# Patient Record
Sex: MALE | Race: WHITE | Employment: OTHER | URBAN - METROPOLITAN AREA
[De-identification: names, ages, dates, MRNs, and addresses within clinical notes are randomized per-mention and may not be internally consistent; named-entity substitution may affect disease eponyms.]

---

## 2023-05-10 ENCOUNTER — HOSPITAL ENCOUNTER (INPATIENT)
Age: 63
LOS: 2 days | Discharge: HOME | End: 2023-05-12
Attending: EMERGENCY MEDICINE | Admitting: STUDENT IN AN ORGANIZED HEALTH CARE EDUCATION/TRAINING PROGRAM
Payer: COMMERCIAL

## 2023-05-10 ENCOUNTER — APPOINTMENT (OUTPATIENT)
Dept: GENERAL RADIOLOGY | Age: 63
End: 2023-05-10
Payer: COMMERCIAL

## 2023-05-10 DIAGNOSIS — I48.91 NEW ONSET ATRIAL FIBRILLATION (HCC): Primary | ICD-10-CM

## 2023-05-10 DIAGNOSIS — I50.9 NEW ONSET OF CONGESTIVE HEART FAILURE (HCC): ICD-10-CM

## 2023-05-10 DIAGNOSIS — I50.21 ACUTE SYSTOLIC CONGESTIVE HEART FAILURE (HCC): ICD-10-CM

## 2023-05-10 PROBLEM — I10 HYPERTENSION: Status: ACTIVE | Noted: 2023-05-10

## 2023-05-10 LAB
ABSOLUTE EOS #: 0.15 K/UL (ref 0–0.4)
ABSOLUTE IMMATURE GRANULOCYTE: 0 K/UL (ref 0–0.3)
ABSOLUTE LYMPH #: 0.77 K/UL (ref 1–4.8)
ABSOLUTE MONO #: 0.26 K/UL (ref 0.2–0.8)
ALBUMIN SERPL-MCNC: 3.5 G/DL (ref 3.5–5.2)
ALP SERPL-CCNC: 160 U/L (ref 40–129)
ALT SERPL-CCNC: 29 U/L (ref 5–41)
ANION GAP SERPL CALCULATED.3IONS-SCNC: 14 MMOL/L (ref 9–17)
ANTI-XA UNFRAC HEPARIN: 0.1 IU/L (ref 0.3–0.7)
ANTI-XA UNFRAC HEPARIN: 0.15 IU/L (ref 0.3–0.7)
AST SERPL-CCNC: 40 U/L
BASOPHILS # BLD: 0 %
BASOPHILS ABSOLUTE: 0 K/UL (ref 0–0.2)
BILIRUB DIRECT SERPL-MCNC: 1.1 MG/DL
BILIRUB INDIRECT SERPL-MCNC: 0.8 MG/DL (ref 0–1)
BILIRUB SERPL-MCNC: 1.9 MG/DL (ref 0.3–1.2)
BNP SERPL-MCNC: 4016 PG/ML
BUN SERPL-MCNC: 12 MG/DL (ref 8–23)
BUN/CREAT BLD: 15 (ref 9–20)
CALCIUM SERPL-MCNC: 8.9 MG/DL (ref 8.6–10.4)
CHLORIDE SERPL-SCNC: 102 MMOL/L (ref 98–107)
CO2 SERPL-SCNC: 21 MMOL/L (ref 20–31)
CREAT SERPL-MCNC: 0.81 MG/DL (ref 0.7–1.2)
EOSINOPHILS RELATIVE PERCENT: 3 % (ref 1–4)
GFR SERPL CREATININE-BSD FRML MDRD: >60 ML/MIN/1.73M2
GLUCOSE SERPL-MCNC: 86 MG/DL (ref 70–99)
HCT VFR BLD AUTO: 39.6 % (ref 40.7–50.3)
HGB BLD-MCNC: 14 G/DL (ref 13–17)
IMMATURE GRANULOCYTES: 0 %
INR PPP: 1.4
LYMPHOCYTES # BLD: 15 % (ref 24–44)
MAGNESIUM SERPL-MCNC: 1.7 MG/DL (ref 1.6–2.6)
MCH RBC QN AUTO: 32.3 PG (ref 25.2–33.5)
MCHC RBC AUTO-ENTMCNC: 35.4 G/DL (ref 28.4–34.8)
MCV RBC AUTO: 91.2 FL (ref 82.6–102.9)
MONOCYTES # BLD: 5 % (ref 1–7)
NRBC AUTOMATED: 0.6 PER 100 WBC
PARTIAL THROMBOPLASTIN TIME: 28.1 SEC (ref 23.9–33.8)
PDW BLD-RTO: 15.4 % (ref 11.8–14.4)
PLATELET # BLD AUTO: 252 K/UL (ref 138–453)
PMV BLD AUTO: 11.8 FL (ref 8.1–13.5)
POTASSIUM SERPL-SCNC: 4.1 MMOL/L (ref 3.7–5.3)
PROT SERPL-MCNC: 6.6 G/DL (ref 6.4–8.3)
PROTHROMBIN TIME: 16.9 SEC (ref 11.5–14.2)
RBC # BLD: 4.34 M/UL (ref 4.21–5.77)
SEG NEUTROPHILS: 77 % (ref 36–66)
SEGMENTED NEUTROPHILS ABSOLUTE COUNT: 3.92 K/UL (ref 1.8–7.7)
SODIUM SERPL-SCNC: 137 MMOL/L (ref 135–144)
TROPONIN I SERPL DL<=0.01 NG/ML-MCNC: 25 NG/L (ref 0–22)
TROPONIN I SERPL DL<=0.01 NG/ML-MCNC: 26 NG/L (ref 0–22)
WBC # BLD AUTO: 5.1 K/UL (ref 3.5–11.3)

## 2023-05-10 PROCEDURE — 6360000002 HC RX W HCPCS: Performed by: EMERGENCY MEDICINE

## 2023-05-10 PROCEDURE — 36415 COLL VENOUS BLD VENIPUNCTURE: CPT

## 2023-05-10 PROCEDURE — 1200000000 HC SEMI PRIVATE

## 2023-05-10 PROCEDURE — 99223 1ST HOSP IP/OBS HIGH 75: CPT | Performed by: NURSE PRACTITIONER

## 2023-05-10 PROCEDURE — 83735 ASSAY OF MAGNESIUM: CPT

## 2023-05-10 PROCEDURE — 6370000000 HC RX 637 (ALT 250 FOR IP): Performed by: NURSE PRACTITIONER

## 2023-05-10 PROCEDURE — 2580000003 HC RX 258: Performed by: NURSE PRACTITIONER

## 2023-05-10 PROCEDURE — 80048 BASIC METABOLIC PNL TOTAL CA: CPT

## 2023-05-10 PROCEDURE — 99285 EMERGENCY DEPT VISIT HI MDM: CPT

## 2023-05-10 PROCEDURE — 80076 HEPATIC FUNCTION PANEL: CPT

## 2023-05-10 PROCEDURE — 93005 ELECTROCARDIOGRAM TRACING: CPT | Performed by: EMERGENCY MEDICINE

## 2023-05-10 PROCEDURE — 85730 THROMBOPLASTIN TIME PARTIAL: CPT

## 2023-05-10 PROCEDURE — 84484 ASSAY OF TROPONIN QUANT: CPT

## 2023-05-10 PROCEDURE — 85025 COMPLETE CBC W/AUTO DIFF WBC: CPT

## 2023-05-10 PROCEDURE — 85610 PROTHROMBIN TIME: CPT

## 2023-05-10 PROCEDURE — 96374 THER/PROPH/DIAG INJ IV PUSH: CPT

## 2023-05-10 PROCEDURE — 71045 X-RAY EXAM CHEST 1 VIEW: CPT

## 2023-05-10 PROCEDURE — 83880 ASSAY OF NATRIURETIC PEPTIDE: CPT

## 2023-05-10 PROCEDURE — 2500000003 HC RX 250 WO HCPCS: Performed by: EMERGENCY MEDICINE

## 2023-05-10 PROCEDURE — 6360000002 HC RX W HCPCS: Performed by: NURSE PRACTITIONER

## 2023-05-10 PROCEDURE — 85520 HEPARIN ASSAY: CPT

## 2023-05-10 RX ORDER — M-VIT,TX,IRON,MINS/CALC/FOLIC 27MG-0.4MG
1 TABLET ORAL DAILY
COMMUNITY

## 2023-05-10 RX ORDER — MAGNESIUM SULFATE IN WATER 40 MG/ML
2000 INJECTION, SOLUTION INTRAVENOUS ONCE
Status: COMPLETED | OUTPATIENT
Start: 2023-05-10 | End: 2023-05-10

## 2023-05-10 RX ORDER — HEPARIN SODIUM 10000 [USP'U]/100ML
5-30 INJECTION, SOLUTION INTRAVENOUS CONTINUOUS
Status: DISCONTINUED | OUTPATIENT
Start: 2023-05-10 | End: 2023-05-12

## 2023-05-10 RX ORDER — HEPARIN SODIUM 1000 [USP'U]/ML
4000 INJECTION, SOLUTION INTRAVENOUS; SUBCUTANEOUS ONCE
Status: COMPLETED | OUTPATIENT
Start: 2023-05-10 | End: 2023-05-10

## 2023-05-10 RX ORDER — ONDANSETRON 2 MG/ML
4 INJECTION INTRAMUSCULAR; INTRAVENOUS EVERY 6 HOURS PRN
Status: DISCONTINUED | OUTPATIENT
Start: 2023-05-10 | End: 2023-05-12 | Stop reason: HOSPADM

## 2023-05-10 RX ORDER — FUROSEMIDE 10 MG/ML
40 INJECTION INTRAMUSCULAR; INTRAVENOUS ONCE
Status: COMPLETED | OUTPATIENT
Start: 2023-05-10 | End: 2023-05-10

## 2023-05-10 RX ORDER — FOLIC ACID 1 MG/1
1 TABLET ORAL DAILY
COMMUNITY

## 2023-05-10 RX ORDER — HEPARIN SODIUM 1000 [USP'U]/ML
2000 INJECTION, SOLUTION INTRAVENOUS; SUBCUTANEOUS PRN
Status: DISCONTINUED | OUTPATIENT
Start: 2023-05-10 | End: 2023-05-12

## 2023-05-10 RX ORDER — POLYETHYLENE GLYCOL 3350 17 G/17G
17 POWDER, FOR SOLUTION ORAL DAILY PRN
Status: DISCONTINUED | OUTPATIENT
Start: 2023-05-10 | End: 2023-05-12 | Stop reason: HOSPADM

## 2023-05-10 RX ORDER — FUROSEMIDE 10 MG/ML
40 INJECTION INTRAMUSCULAR; INTRAVENOUS 2 TIMES DAILY
Status: DISCONTINUED | OUTPATIENT
Start: 2023-05-11 | End: 2023-05-12

## 2023-05-10 RX ORDER — ONDANSETRON 4 MG/1
4 TABLET, ORALLY DISINTEGRATING ORAL EVERY 8 HOURS PRN
Status: DISCONTINUED | OUTPATIENT
Start: 2023-05-10 | End: 2023-05-12 | Stop reason: HOSPADM

## 2023-05-10 RX ORDER — ACETAMINOPHEN 325 MG/1
650 TABLET ORAL EVERY 6 HOURS PRN
Status: DISCONTINUED | OUTPATIENT
Start: 2023-05-10 | End: 2023-05-12 | Stop reason: HOSPADM

## 2023-05-10 RX ORDER — METOPROLOL TARTRATE 5 MG/5ML
5 INJECTION INTRAVENOUS ONCE
Status: COMPLETED | OUTPATIENT
Start: 2023-05-10 | End: 2023-05-10

## 2023-05-10 RX ORDER — ENOXAPARIN SODIUM 150 MG/ML
1 INJECTION SUBCUTANEOUS 2 TIMES DAILY
Status: CANCELLED | OUTPATIENT
Start: 2023-05-10

## 2023-05-10 RX ORDER — SODIUM CHLORIDE 0.9 % (FLUSH) 0.9 %
5-40 SYRINGE (ML) INJECTION EVERY 12 HOURS SCHEDULED
Status: DISCONTINUED | OUTPATIENT
Start: 2023-05-10 | End: 2023-05-12 | Stop reason: HOSPADM

## 2023-05-10 RX ORDER — LEFLUNOMIDE 20 MG/1
20 TABLET ORAL DAILY
COMMUNITY

## 2023-05-10 RX ORDER — HEPARIN SODIUM 1000 [USP'U]/ML
4000 INJECTION, SOLUTION INTRAVENOUS; SUBCUTANEOUS PRN
Status: DISCONTINUED | OUTPATIENT
Start: 2023-05-10 | End: 2023-05-12

## 2023-05-10 RX ORDER — SODIUM CHLORIDE 0.9 % (FLUSH) 0.9 %
10 SYRINGE (ML) INJECTION PRN
Status: DISCONTINUED | OUTPATIENT
Start: 2023-05-10 | End: 2023-05-12 | Stop reason: HOSPADM

## 2023-05-10 RX ORDER — ACETAMINOPHEN 650 MG/1
650 SUPPOSITORY RECTAL EVERY 6 HOURS PRN
Status: DISCONTINUED | OUTPATIENT
Start: 2023-05-10 | End: 2023-05-12 | Stop reason: HOSPADM

## 2023-05-10 RX ORDER — SODIUM CHLORIDE 9 MG/ML
INJECTION, SOLUTION INTRAVENOUS PRN
Status: DISCONTINUED | OUTPATIENT
Start: 2023-05-10 | End: 2023-05-12 | Stop reason: HOSPADM

## 2023-05-10 RX ADMIN — HEPARIN SODIUM 2000 UNITS: 1000 INJECTION INTRAVENOUS; SUBCUTANEOUS at 23:41

## 2023-05-10 RX ADMIN — METOPROLOL TARTRATE 5 MG: 5 INJECTION INTRAVENOUS at 16:08

## 2023-05-10 RX ADMIN — HEPARIN SODIUM 4000 UNITS: 1000 INJECTION INTRAVENOUS; SUBCUTANEOUS at 17:24

## 2023-05-10 RX ADMIN — HEPARIN SODIUM 8 UNITS/KG/HR: 10000 INJECTION, SOLUTION INTRAVENOUS at 17:27

## 2023-05-10 RX ADMIN — METOPROLOL TARTRATE 25 MG: 25 TABLET, FILM COATED ORAL at 20:45

## 2023-05-10 RX ADMIN — FUROSEMIDE 40 MG: 10 INJECTION, SOLUTION INTRAMUSCULAR; INTRAVENOUS at 17:27

## 2023-05-10 RX ADMIN — SODIUM CHLORIDE, PRESERVATIVE FREE 10 ML: 5 INJECTION INTRAVENOUS at 20:45

## 2023-05-10 RX ADMIN — SODIUM CHLORIDE: 9 INJECTION, SOLUTION INTRAVENOUS at 20:42

## 2023-05-10 RX ADMIN — MAGNESIUM SULFATE HEPTAHYDRATE 2000 MG: 40 INJECTION, SOLUTION INTRAVENOUS at 20:45

## 2023-05-10 ASSESSMENT — ENCOUNTER SYMPTOMS
NAUSEA: 1
SHORTNESS OF BREATH: 1

## 2023-05-10 ASSESSMENT — PAIN - FUNCTIONAL ASSESSMENT: PAIN_FUNCTIONAL_ASSESSMENT: NONE - DENIES PAIN

## 2023-05-10 NOTE — PROGRESS NOTES
ADMISSION NOTE         Patient admitted to room: 2042     Time of admit: 1819    Admit from: ED    Reason for admission: New onset AFib    Where patient has been residing for the last 24 hrs: personal residence      Family at bedside: No     Patient is currently: Patient ambulated to bed with steady gait, vitals obtained, telemetry applied. Patient denies any pain, including specifically any chest pain. No distress apparent. Patient oriented to room, educated on how to use call light, bed controls, and to call for assistance. Bed in lowest and locked position. Two siderails up for safety. Call light within reach. Verbal safety instructions provided and patient verbalizd understanding.

## 2023-05-10 NOTE — PROGRESS NOTES
Transitions of Care Pharmacy Service   Medication Review    The patient's list of current home medications was reviewed and updated earlier today while he was in the ED. Source(s) of information: patient, Surescripts refill report      PROVIDER ACTION REQUESTED  Medications that need to be addressed by a physician/nurse practitioner:    Medication Action Requested        Home med list is ready for physician review         Please feel free to call me with any questions about this encounter. Thank you.     Bruce Mayo, [facility]   Transitions of Care Pharmacy Service  Phone:  [phone]  Fax: [phone]      Electronically signed by Bruce Mayo [facility] on 5/10/2023 at 7:11 PM           Medications Prior to Admission:   folic acid (FOLVITE) 1 MG tablet, Take 1 tablet by mouth daily  leflunomide (ARAVA) 20 MG tablet, Take 1 tablet by mouth daily  methotrexate (RHEUMATREX) 2.5 MG chemo tablet, Take 6 tablets by mouth once a week Indications: Mondays Takes 6 tabs (15mg) weekly  Multiple Vitamins-Minerals (THERAPEUTIC MULTIVITAMIN-MINERALS) tablet, Take 1 tablet by mouth daily  sulfaSALAzine (AZULFIDINE) 500 MG tablet, Take 2 tablets by mouth 2 times daily  naproxen (NAPROSYN) 500 MG tablet, Take 1 tablet by mouth 2 times daily as needed

## 2023-05-10 NOTE — ED PROVIDER NOTES
Notable for the following components:    Pro-BNP 4,016 (*)     All other components within normal limits   CBC WITH AUTO DIFFERENTIAL - Abnormal; Notable for the following components:    Hematocrit 39.6 (*)     MCHC 35.4 (*)     RDW 15.4 (*)     NRBC Automated 0.6 (*)     Seg Neutrophils 77 (*)     Lymphocytes 15 (*)     Absolute Lymph # 0.77 (*)     All other components within normal limits   BASIC METABOLIC PANEL   TROPONIN       Vitals Reviewed:    Vitals:    05/10/23 1337 05/10/23 1432 05/10/23 1439 05/10/23 1615   BP: (!) 134/103   (!) 132/110   Pulse: 77 (!) 125 (!) 113 (!) 110   Resp: 16 24 23 17   Temp: 98.2 °F (36.8 °C)      TempSrc: Oral      SpO2: 97% 96% 96% 95%   Weight: 261 lb (118.4 kg)      Height: 6' 4\" (1.93 m)        MEDICATIONS GIVEN TO PATIENT THIS ENCOUNTER:  Orders Placed This Encounter   Medications    metoprolol (LOPRESSOR) injection 5 mg    furosemide (LASIX) injection 40 mg     DISCHARGE PRESCRIPTIONS:  New Prescriptions    No medications on file     PHYSICIAN CONSULTS ORDERED THIS ENCOUNTER:  IP CONSULT TO INTERNAL MEDICINE  IP CONSULT TO CARDIOLOGY  FINAL IMPRESSION      1. New onset atrial fibrillation ([address]    2. New onset of congestive heart failure ([facility] [address]          DISPOSITION/PLAN   DISPOSITION Decision To Admit 05/10/2023 04:17:57 PM      OUTPATIENT FOLLOW UP THE PATIENT:  No follow-up provider specified.     MD Glen León MD  05/10/23 8457

## 2023-05-10 NOTE — H&P
Family History   Problem Relation Age of Onset    No Known Problems Mother     No Known Problems Father        Review of Systems:     Positive and Negative as described in HPI. Review of Systems   Respiratory:  Positive for shortness of breath. Cardiovascular:  Positive for leg swelling. Gastrointestinal:  Positive for nausea. Neurological:  Positive for dizziness (with exertion). All other systems reviewed and are negative. Physical Exam:   BP (!) 132/110   Pulse 99   Temp 98.2 °F (36.8 °C) (Oral)   Resp 17   Ht 6' 4\" (1.93 m)   Wt 261 lb (118.4 kg)   SpO2 96%   BMI 31.77 kg/m²   Temp (24hrs), Av.2 °F (36.8 °C), Min:98.2 °F (36.8 °C), Max:98.2 °F (36.8 °C)    No results for input(s): POCGLU in the last 72 hours. No intake or output data in the 24 hours ending 05/10/23 9057    Physical Exam  Vitals and nursing note reviewed. HENT:      Mouth/Throat:      Mouth: Mucous membranes are moist.   Eyes:      Conjunctiva/sclera: Conjunctivae normal.   Cardiovascular:      Rate and Rhythm: Normal rate. Rhythm irregular. Pulses: Normal pulses. Heart sounds: Normal heart sounds. Pulmonary:      Effort: Pulmonary effort is normal.      Breath sounds: Normal breath sounds. Abdominal:      General: Bowel sounds are normal.      Palpations: Abdomen is soft. Musculoskeletal:      Cervical back: Neck supple. Right lower le+ Edema present. Left lower le+ Edema present. Skin:     General: Skin is warm and dry. Capillary Refill: Capillary refill takes less than 2 seconds. Neurological:      Mental Status: He is alert and oriented to person, place, and time.    Psychiatric:         Mood and Affect: Mood normal.       Investigations:      Laboratory Testing:  Recent Results (from the past 24 hour(s))   EKG 12 Lead    Collection Time: 05/10/23  1:55 PM   Result Value Ref Range    Ventricular Rate 110 BPM    Atrial Rate 166 BPM    QRS Duration 96 ms    Q-T Interval

## 2023-05-10 NOTE — ED NOTES
ED to inpatient nurses report     Chief Complaint   Patient presents with    Irregular Heart Beat     Sent by PCP    Leg Swelling     bilat      Present to ED from home with Irregular Heart Beat (Sent by PCP) and Leg Swelling (bilat)   and is admitted to the hospital for the management of Atrial fibrillation with RVR ([address] The patent was  sent from his doctor's office for new onset atrial fibrillation and lower extremity edema. Patient reports exertional dyspnea, occasional dizziness and lower extremity edema over the past few months. This morning, he had his first appointment with Dr. Pradeep Oliveira, who noted atrial fibrillation with RVR on his EKG and sent him here. Patient's medical history includes lymphedema and arthritis. Chest x-ray showed no acute process  Potassium 4.1  BNP is 4016 troponin 26  EKG here confirmed A-fib with RVR     The patient was given 5 of IV Lopressor 4 mg IV Lasix  LOC: alert and orientated to name, place, date  Vital signs   Vitals:    05/10/23 1605 05/10/23 1610 05/10/23 1615 05/10/23 1640   BP: (!) 153/103 (!) 129/109 (!) 132/110    Pulse: (!) 108 (!) 112 (!) 110 99   Resp: 18 22 17 17   Temp:       TempSrc:       SpO2: 96% 96% 95% 96%   Weight:       Height:          Oxygen Baseline RA    Current needs required RA     LDAs:   Peripheral IV 05/10/23 Right Hand (Active)   Site Assessment Clean, dry & intact 05/10/23 1418   Line Status Blood return noted; Flushed 05/10/23 1418   Dressing Status New dressing applied;Clean, dry & intact 05/10/23 1418     Mobility: Fully dependent  Fall Risk:    Pending ED orders: None  Present condition: Stable  Code Status: Full  Consults: IP CONSULT TO INTERNAL MEDICINE  IP CONSULT TO CARDIOLOGY  [x]  Hospitalist  Completed  [x] yes [] no Who:   []  Medicine  Completed  [] yes [] No Who:   []  Cardiology  Completed  [] yes [] No Who:   []  GI   Completed  [] yes [] No Who:   []  Neurology  Completed  [] yes [] No Who:   []  Nephrology Completed  []

## 2023-05-11 ENCOUNTER — APPOINTMENT (OUTPATIENT)
Dept: ULTRASOUND IMAGING | Age: 63
End: 2023-05-11
Payer: COMMERCIAL

## 2023-05-11 LAB
ANION GAP SERPL CALCULATED.3IONS-SCNC: 12 MMOL/L (ref 9–17)
ANTI-XA UNFRAC HEPARIN: 0.22 IU/L (ref 0.3–0.7)
ANTI-XA UNFRAC HEPARIN: 0.27 IU/L (ref 0.3–0.7)
ANTI-XA UNFRAC HEPARIN: 0.31 IU/L (ref 0.3–0.7)
BNP SERPL-MCNC: 3638 PG/ML
BUN SERPL-MCNC: 14 MG/DL (ref 8–23)
BUN/CREAT BLD: 15 (ref 9–20)
CALCIUM SERPL-MCNC: 8.8 MG/DL (ref 8.6–10.4)
CHLORIDE SERPL-SCNC: 101 MMOL/L (ref 98–107)
CO2 SERPL-SCNC: 25 MMOL/L (ref 20–31)
CREAT SERPL-MCNC: 0.95 MG/DL (ref 0.7–1.2)
EKG ATRIAL RATE: 166 BPM
EKG Q-T INTERVAL: 348 MS
EKG QRS DURATION: 96 MS
EKG QTC CALCULATION (BAZETT): 470 MS
EKG R AXIS: 1 DEGREES
EKG T AXIS: 62 DEGREES
EKG VENTRICULAR RATE: 110 BPM
GFR SERPL CREATININE-BSD FRML MDRD: >60 ML/MIN/1.73M2
GLUCOSE SERPL-MCNC: 91 MG/DL (ref 70–99)
HCT VFR BLD AUTO: 40 % (ref 40.7–50.3)
HGB BLD-MCNC: 13.5 G/DL (ref 13–17)
LV EF: 30 %
LV EF: 40 %
LVEF MODALITY: NORMAL
LVEF MODALITY: NORMAL
MAGNESIUM SERPL-MCNC: 1.9 MG/DL (ref 1.6–2.6)
MCH RBC QN AUTO: 31.3 PG (ref 25.2–33.5)
MCHC RBC AUTO-ENTMCNC: 33.8 G/DL (ref 28.4–34.8)
MCV RBC AUTO: 92.8 FL (ref 82.6–102.9)
NRBC AUTOMATED: 1.3 PER 100 WBC
PDW BLD-RTO: 15.7 % (ref 11.8–14.4)
PLATELET # BLD AUTO: 241 K/UL (ref 138–453)
PMV BLD AUTO: 11.6 FL (ref 8.1–13.5)
POTASSIUM SERPL-SCNC: 4 MMOL/L (ref 3.7–5.3)
RBC # BLD: 4.31 M/UL (ref 4.21–5.77)
SODIUM SERPL-SCNC: 138 MMOL/L (ref 135–144)
TSH SERPL-ACNC: 2.92 UIU/ML (ref 0.3–5)
WBC # BLD AUTO: 3.9 K/UL (ref 3.5–11.3)

## 2023-05-11 PROCEDURE — 93306 TTE W/DOPPLER COMPLETE: CPT

## 2023-05-11 PROCEDURE — 99232 SBSQ HOSP IP/OBS MODERATE 35: CPT | Performed by: STUDENT IN AN ORGANIZED HEALTH CARE EDUCATION/TRAINING PROGRAM

## 2023-05-11 PROCEDURE — 6370000000 HC RX 637 (ALT 250 FOR IP): Performed by: NURSE PRACTITIONER

## 2023-05-11 PROCEDURE — 93312 ECHO TRANSESOPHAGEAL: CPT

## 2023-05-11 PROCEDURE — 6360000002 HC RX W HCPCS: Performed by: NURSE PRACTITIONER

## 2023-05-11 PROCEDURE — 92960 CARDIOVERSION ELECTRIC EXT: CPT | Performed by: INTERNAL MEDICINE

## 2023-05-11 PROCEDURE — 6370000000 HC RX 637 (ALT 250 FOR IP)

## 2023-05-11 PROCEDURE — 93010 ELECTROCARDIOGRAM REPORT: CPT | Performed by: INTERNAL MEDICINE

## 2023-05-11 PROCEDURE — 85520 HEPARIN ASSAY: CPT

## 2023-05-11 PROCEDURE — 84443 ASSAY THYROID STIM HORMONE: CPT

## 2023-05-11 PROCEDURE — 1200000000 HC SEMI PRIVATE

## 2023-05-11 PROCEDURE — B24BZZ4 ULTRASONOGRAPHY OF HEART WITH AORTA, TRANSESOPHAGEAL: ICD-10-PCS | Performed by: INTERNAL MEDICINE

## 2023-05-11 PROCEDURE — 36415 COLL VENOUS BLD VENIPUNCTURE: CPT

## 2023-05-11 PROCEDURE — 6360000002 HC RX W HCPCS

## 2023-05-11 PROCEDURE — 6360000002 HC RX W HCPCS: Performed by: EMERGENCY MEDICINE

## 2023-05-11 PROCEDURE — 80048 BASIC METABOLIC PNL TOTAL CA: CPT

## 2023-05-11 PROCEDURE — 83735 ASSAY OF MAGNESIUM: CPT

## 2023-05-11 PROCEDURE — 83880 ASSAY OF NATRIURETIC PEPTIDE: CPT

## 2023-05-11 PROCEDURE — 76705 ECHO EXAM OF ABDOMEN: CPT

## 2023-05-11 PROCEDURE — 92960 CARDIOVERSION ELECTRIC EXT: CPT

## 2023-05-11 PROCEDURE — 93312 ECHO TRANSESOPHAGEAL: CPT | Performed by: INTERNAL MEDICINE

## 2023-05-11 PROCEDURE — 85027 COMPLETE CBC AUTOMATED: CPT

## 2023-05-11 RX ADMIN — FUROSEMIDE 40 MG: 10 INJECTION, SOLUTION INTRAMUSCULAR; INTRAVENOUS at 10:00

## 2023-05-11 RX ADMIN — HEPARIN SODIUM 2000 UNITS: 1000 INJECTION INTRAVENOUS; SUBCUTANEOUS at 21:06

## 2023-05-11 RX ADMIN — HEPARIN SODIUM 12 UNITS/KG/HR: 10000 INJECTION, SOLUTION INTRAVENOUS at 15:39

## 2023-05-11 RX ADMIN — METOPROLOL TARTRATE 25 MG: 25 TABLET, FILM COATED ORAL at 20:38

## 2023-05-11 RX ADMIN — METOPROLOL TARTRATE 25 MG: 25 TABLET, FILM COATED ORAL at 11:25

## 2023-05-11 RX ADMIN — HEPARIN SODIUM 2000 UNITS: 1000 INJECTION INTRAVENOUS; SUBCUTANEOUS at 07:18

## 2023-05-11 RX ADMIN — FUROSEMIDE 40 MG: 10 INJECTION, SOLUTION INTRAMUSCULAR; INTRAVENOUS at 18:34

## 2023-05-11 ASSESSMENT — ENCOUNTER SYMPTOMS
ABDOMINAL PAIN: 0
COUGH: 0
SHORTNESS OF BREATH: 0
EYE REDNESS: 0
EYE PAIN: 0
ABDOMINAL DISTENTION: 0
COLOR CHANGE: 0

## 2023-05-11 NOTE — PROGRESS NOTES
Patient refused labs to be drawn by  as well as a ER . RN educated patient on the importance of being on a heparin gtt and it being therapeutic.  Patient agreeable for lab to be drawn and understands

## 2023-05-11 NOTE — PLAN OF CARE
Problem: Chronic Conditions and Co-morbidities  Goal: Patient's chronic conditions and co-morbidity symptoms are monitored and maintained or improved  5/11/2023 0642 by Belinda Severe, RN  Outcome: Progressing  5/11/2023 0641 by Belinda Severe, RN  Outcome: Progressing  Flowsheets (Taken 5/10/2023 2000)  Care Plan - Patient's Chronic Conditions and Co-Morbidity Symptoms are Monitored and Maintained or Improved:   Monitor and assess patient's chronic conditions and comorbid symptoms for stability, deterioration, or improvement   Collaborate with multidisciplinary team to address chronic and comorbid conditions and prevent exacerbation or deterioration   Update acute care plan with appropriate goals if chronic or comorbid symptoms are exacerbated and prevent overall improvement and discharge     Problem: Discharge Planning  Goal: Discharge to home or other facility with appropriate resources  5/11/2023 0642 by Belinda Severe, RN  Outcome: Progressing  5/11/2023 0641 by Belinda Severe, RN  Outcome: Progressing  Flowsheets  Taken 5/10/2023 2000 by Belinda Severe, RN  Discharge to home or other facility with appropriate resources:   Identify barriers to discharge with patient and caregiver   Arrange for needed discharge resources and transportation as appropriate   Identify discharge learning needs (meds, wound care, etc)   Refer to discharge planning if patient needs post-hospital services based on physician order or complex needs related to functional status, cognitive ability or social support system  Taken 5/10/2023 1853 by Olamide Powell RN  Discharge to home or other facility with appropriate resources:   Identify barriers to discharge with patient and caregiver   Arrange for needed discharge resources and transportation as appropriate   Identify discharge learning needs (meds, wound care, etc)   Refer to discharge planning if patient needs post-hospital services based on physician order or complex

## 2023-05-11 NOTE — OP NOTE
[facility]  RAYSA / Cardioversion procedure Note         Today's Date: 5/11/2023  Primary/Ordering Cardiologist:   Indication:     Operators:  Primary:  Assistant/CV Fellow:     Pre Procedure Conscious Sedation Data:    ASA Class:    [] I [] II [x] III [] IV    Mallampati Class:  [] I [] II [x] III [] IV    Procedure:    Patient seen and examined. History and Physical reviewed. Labs reviewed. After informed consent was obtained with explanation of the risks and benefits, the patient was brought to Cath lab. All sedation was administered by the cardiologist. The oropharynx was pre anesthetisized with cetacaine spray. RAYSA:    Structures:    LA: Normal  GORDO: No thrombus  RA: Normal  RV:  Normal  LV: Severe global hypokinesia  Estimated LVEF: 30%  Aorta: Mild atheromatous disease arch  Percardium: No pericardial effusion  Septum: No intracardiac shunt via color Doppler. No intracardiac shunt via injection of agitated saline. Valves:    Mitral Valve: Structurally normal. Moderate regurgitation is identified. Aortic Valve: The aortic valve is trileaflet and opens adequately. no regurgitiation is identified. Tricuspid valve: Structurally normal. moderate regurgitation is identified. Pulmonary valve: Normal. No significant regurgitation    No valvular vegetations or thrombus identified. Summary:     1. A RAYSA was performed without complications. 2. LVEF 25-30%  3. No thrombus or valvular vegetation identified      CARDIOVERSION:    After an adequate level of sedation was achieved, 200J in biphasic synchronized delivery was administered. conversion to normal sinus rhythm. The patient awoke without complications. A post procedure 12 L ECG was ordered and reviewed. The patient will continue with the discharge meds and has been instructed to follow-up with Dr. Nathalie Mcarthur for continued long term care and cardiovascular management. There were no complications encountered.       Electronically signed by

## 2023-05-11 NOTE — PLAN OF CARE
Problem: Chronic Conditions and Co-morbidities  Goal: Patient's chronic conditions and co-morbidity symptoms are monitored and maintained or improved  5/11/2023 1443 by Vu Kapoor RN  Outcome: Progressing  Flowsheets (Taken 5/11/2023 0800)  Care Plan - Patient's Chronic Conditions and Co-Morbidity Symptoms are Monitored and Maintained or Improved:   Update acute care plan with appropriate goals if chronic or comorbid symptoms are exacerbated and prevent overall improvement and discharge   Collaborate with multidisciplinary team to address chronic and comorbid conditions and prevent exacerbation or deterioration   Monitor and assess patient's chronic conditions and comorbid symptoms for stability, deterioration, or improvement     Problem: Discharge Planning  Goal: Discharge to home or other facility with appropriate resources  5/11/2023 1443 by Vu Kapoor RN  Outcome: Progressing  Flowsheets (Taken 5/11/2023 0800)  Discharge to home or other facility with appropriate resources:   Identify barriers to discharge with patient and caregiver   Arrange for needed discharge resources and transportation as appropriate   Identify discharge learning needs (meds, wound care, etc)   Arrange for interpreters to assist at discharge as needed   Refer to discharge planning if patient needs post-hospital services based on physician order or complex needs related to functional status, cognitive ability or social support system     Problem: Safety - Adult  Goal: Free from fall injury  5/11/2023 1443 by Vu Kapoor RN  Outcome: Progressing  Flowsheets (Taken 5/11/2023 0800)  Free From Fall Injury: Instruct family/caregiver on patient safety     Problem: ABCDS Injury Assessment  Goal: Absence of physical injury  5/11/2023 1443 by Vu Kapoor RN  Outcome: Progressing  Flowsheets (Taken 5/11/2023 0800)  Absence of Physical Injury: Implement safety measures based on patient assessment     Problem: Cardiovascular - Adult  Goal:

## 2023-05-11 NOTE — FLOWSHEET NOTE
05/10/23 2330   Skin Integumentary    Skin Integumentary (WDL) X   Skin Color Other (comment)  (Head purple, body pale when laying down)       When adjusting heparin gtt dose, Patient was observed to be laying flat trying to sleep. His face and neck were discolored and appeared purple. Second RN confirmed his face does look purple when dual signing off on Heparin. Patient asked to sit up and HOB adjusted to semi fowlers. Patient stated that he may as well sit up since he won't be getting sleep tonight. He then stated he would try to sit in the recliner. 20 minutes later when applying his BP cuff for midnight vitals, his color improved to baseline.  But he then went back to lying flat in bed.     0400  No other color changes noted during hourly rounding

## 2023-05-11 NOTE — PROGRESS NOTES
Physician Progress Note      PATIENT:               Sarahi Partida  [facility] #:                  [id number]  :                       1960  ADMIT DATE:       5/10/2023 1:43 PM  [address] [place] DATE:  RESPONDING  PROVIDER #:        Pedro Pablo Arredondo MD          QUERY TEXT:    Patient admitted with irregular heartbeat, noted to have atrial fibrillation. If possible, please document in progress notes and discharge summary if you   are evaluating and/or treating any of the following: The medical record reflects the following:  Risk Factors: HTN, CHF  Clinical Indicators: [facility] of , patient with new onset afib  Treatment: Heparin Drip    Thank you,  Bruce Lyn, RN  Options provided:  -- Secondary hypercoagulable state in a patient with atrial fibrillation  -- Other - I will add my own diagnosis  -- Disagree - Not applicable / Not valid  -- Disagree - Clinically unable to determine / Unknown  -- Refer to Clinical Documentation Reviewer    PROVIDER RESPONSE TEXT:    This patient has secondary hypercoagulable state in a patient with atrial   fibrillation. Query created by:  Chantel Rogers on 2023 5:41 AM      Electronically signed by:  Pedro Pablo Arredondo MD 2023 1:49 PM

## 2023-05-11 NOTE — PROGRESS NOTES
[facility]  Office: [address], DO, Tejas Less, DO, Amina Covelo, DO, Julius Phills Blood, DO, Israel Villafuerte MD, Colton Beckett MD, Silvia Guevara MD, Myesha Goins MD,  Candace Mccormack MD, Deisy Lindquist MD, Migue Graf, DO, Evan Jean MD,  Ace Méndez MD, Di Sanchez MD, Ratna Mccray, DO, Jacobo Tavarez MD, Sandy Young MD, Matheus Thurman, DO, Angelina Dahl MD, Johnetta Aschoff, MD, Mira Messina MD, Ruy Kyle MD,  Angeline Persaud DO, Des Karimi MD,  Gely Briscoe CNP,  Stephanie Fay CNP, Israel James CNP, Jaleesa Baron, CNP,  Christina Eisenmenger, [facility], Irene Mohr, CNP, Mariano Chamorro, CNP, Carolee Tuttle, CNP, Gus Diez, CNP, Ervin Brown, CNP, [place] PA-C, Cande Swartz, CNS, Ramon Sims, CNP, Spencer Nicole, [facility]    Progress Note    5/11/2023    8:20 AM    Name:   Karina Dudley  MRN:     [id number]     Acct:      [de-identified]   Room:   2042/2042-01  IP Day:  1  Admit Date:  5/10/2023  1:43 PM    PCP:   No primary care provider on file. Code Status:  Full Code    Subjective:     C/C:   Chief Complaint   Patient presents with    Irregular Heart Beat     Sent by PCP    Leg Swelling     bilat     Interval History Status: not changed. Patietn seen and examined. Feels ok, heart rate improved. Leg swelling per patient has improved somewhat but  is still greater than his prior baseline. Brief History:     58year old male with past medical history of daily alcohol use presents to PCP office for fatigue and leg swellign noted to have Afib with RVR. Patient admitted for afib with rvr and new onset CHF exacerbation. Patient being diuresed with IV lasix and on heparin drip for anticoagulation    Review of Systems:     Review of Systems   Constitutional:  Negative for activity change, fatigue and fever.    HENT:  Negative for congestion and

## 2023-05-11 NOTE — CONSULTS
05/10/23  1410 05/11/23  0450   WBC 5.1 3.9   HGB 14.0 13.5   HCT 39.6* 40.0*    241     BMP:   Recent Labs     05/10/23  1410 05/11/23  0450    138   K 4.1 4.0   CO2 21 25   BUN 12 14   CREATININE 0.81 0.95   LABGLOM >60 >60   GLUCOSE 86 91     BNP: No results for input(s): BNP in the last 72 hours. PT/INR:   Recent Labs     05/10/23  1705   PROTIME 16.9*   INR 1.4     APTT:  Recent Labs     05/10/23  1705   APTT 28.1     CARDIAC ENZYMES:No results for input(s): CKTOTAL, CKMB, CKMBINDEX, TROPONINI in the last 72 hours. FASTING LIPID PANEL:No results found for: HDL, LDLDIRECT, LDLCALC, TRIG  LIVER PROFILE:  Recent Labs     05/10/23  1610   AST 40*   ALT 29   LABALBU 3.5       IMPRESSION:    Patient Active Problem List   Diagnosis    Atrial fibrillation with RVR (HCC)    Hypertension    Acute CHF (congestive heart failure) (HCC)     Atrial fibrillation, new onset  Acute CHF, systolic vs diastolic  Minimal HS trop elevation, flat trend likely demand ischemia  HTN    RECOMMENDATIONS:  Continue IV heparin  Continue metoprolol  Continue IV lasix. RAYSA/CV recommended. Risk and benefits discussed. OP stress test.      Discussed with patient and Nurse.     Sancho Campbell MD, MD  [facility]           [phone]

## 2023-05-11 NOTE — CARE COORDINATION
Case Management Assessment  Initial Evaluation    Date/Time of Evaluation: 5/11/2023 1:25 PM  Assessment Completed by: Daryl Jennings RN    If patient is discharged prior to next notation, then this note serves as note for discharge by case management. Patient Name: Vaishnavi De Leon                   YOB: 1960  Diagnosis: New onset atrial fibrillation ([facility] [address] [I48.91]  Atrial fibrillation with RVR ([facility] [address] [I48.91]  New onset of congestive heart failure ([facility] [address] [I50.9]                   Date / Time: 5/10/2023  1:43 PM    Patient Admission Status: Inpatient   Readmission Risk (Low < 19, Mod (19-27), High > 27): Readmission Risk Score: 5.4    Current PCP: No primary care provider on file. PCP verified by CM? Yes    Chart Reviewed: Yes      History Provided by: Patient  Patient Orientation: Alert and Oriented, Person, Place, Situation, Self    Patient Cognition: Alert    Hospitalization in the last 30 days (Readmission):  No    If yes, Readmission Assessment in CM Navigator will be completed. Advance Directives:      Code Status: Full Code   Patient's Primary Decision Maker is: Legal Next of Kin      Discharge Planning:    Patient lives with: Alone Type of Home: House  Primary Care Giver: Self  Patient Support Systems include: Family Members   Current Financial resources: None  Current community resources: None  Current services prior to admission: None            Current DME:              Type of Home Care services:  None    ADLS  Prior functional level: Independent in ADLs/IADLs  Current functional level: Independent in ADLs/IADLs    PT AM-PAC:   /24  OT AM-PAC:   /24    Family can provide assistance at DC: Yes  Would you like Case Management to discuss the discharge plan with any other family members/significant others, and if so, who?  No  Plans to Return to Present Housing: Yes  Other Identified Issues/Barriers to RETURNING to current housing: none  Potential Assistance needed at discharge: N/A

## 2023-05-11 NOTE — PROGRESS NOTES
End Of Shift Note  [facility]    Summary of shift: Patient remains irritable that he was admitted overnight and that he won't be able to sleep. He has been up to the bathroom independently. Heparin at 10 with NS at 10. Mag replaced per [facility], NP, at start of shift for a value of 1.7 and to be rechecked in morning. Order in to start lasix 40mg IVP in the morning.      Vitals:    Vitals:    05/10/23 2036 05/10/23 2045 05/11/23 0000 05/11/23 0006   BP: (!) 166/103 (!) 166/103  (!) 115/93   Pulse: 98 97 81 77   Resp: 18   16   Temp: 97.7 °F (36.5 °C)   97.3 °F (36.3 °C)   TempSrc: Temporal   Temporal   SpO2: 96%  97% 93%   Weight:       Height:            I&O:   Intake/Output Summary (Last 24 hours) at 5/11/2023 0353  Last data filed at 5/10/2023 2200  Gross per 24 hour   Intake 360 ml   Output --   Net 360 ml       Resp Status: Room air    Ventilator Settings:     / / /     Critical Care IV infusions:   sodium chloride 10 mL/hr at 05/10/23 2042    heparin (PORCINE) Infusion 10 Units/kg/hr (05/10/23 2342)        LDA:   Peripheral IV 05/10/23 Right Hand (Active)   Number of days: 0

## 2023-05-12 VITALS
HEIGHT: 76 IN | SYSTOLIC BLOOD PRESSURE: 115 MMHG | RESPIRATION RATE: 16 BRPM | TEMPERATURE: 97.8 F | WEIGHT: 244.2 LBS | DIASTOLIC BLOOD PRESSURE: 87 MMHG | OXYGEN SATURATION: 95 % | BODY MASS INDEX: 29.74 KG/M2 | HEART RATE: 82 BPM

## 2023-05-12 LAB
ANTI-XA UNFRAC HEPARIN: 0.54 IU/L (ref 0.3–0.7)
ANTI-XA UNFRAC HEPARIN: 0.62 IU/L (ref 0.3–0.7)
HCT VFR BLD AUTO: 38.9 % (ref 40.7–50.3)
HGB BLD-MCNC: 13.3 G/DL (ref 13–17)
MCH RBC QN AUTO: 31.6 PG (ref 25.2–33.5)
MCHC RBC AUTO-ENTMCNC: 34.2 G/DL (ref 28.4–34.8)
MCV RBC AUTO: 92.4 FL (ref 82.6–102.9)
NRBC AUTOMATED: 2.1 PER 100 WBC
PDW BLD-RTO: 15.9 % (ref 11.8–14.4)
PLATELET # BLD AUTO: 253 K/UL (ref 138–453)
PMV BLD AUTO: 11.5 FL (ref 8.1–13.5)
RBC # BLD: 4.21 M/UL (ref 4.21–5.77)
WBC # BLD AUTO: 4.7 K/UL (ref 3.5–11.3)

## 2023-05-12 PROCEDURE — 99239 HOSP IP/OBS DSCHRG MGMT >30: CPT | Performed by: INTERNAL MEDICINE

## 2023-05-12 PROCEDURE — 6360000002 HC RX W HCPCS: Performed by: INTERNAL MEDICINE

## 2023-05-12 PROCEDURE — 36415 COLL VENOUS BLD VENIPUNCTURE: CPT

## 2023-05-12 PROCEDURE — 6360000002 HC RX W HCPCS: Performed by: EMERGENCY MEDICINE

## 2023-05-12 PROCEDURE — 6360000002 HC RX W HCPCS: Performed by: NURSE PRACTITIONER

## 2023-05-12 PROCEDURE — 85520 HEPARIN ASSAY: CPT

## 2023-05-12 PROCEDURE — 6370000000 HC RX 637 (ALT 250 FOR IP): Performed by: INTERNAL MEDICINE

## 2023-05-12 PROCEDURE — 85027 COMPLETE CBC AUTOMATED: CPT

## 2023-05-12 PROCEDURE — 6370000000 HC RX 637 (ALT 250 FOR IP): Performed by: NURSE PRACTITIONER

## 2023-05-12 RX ORDER — METOPROLOL SUCCINATE 50 MG/1
50 TABLET, EXTENDED RELEASE ORAL DAILY
Qty: 30 TABLET | Refills: 0 | Status: SHIPPED | OUTPATIENT
Start: 2023-05-12

## 2023-05-12 RX ORDER — FUROSEMIDE 40 MG/1
40 TABLET ORAL DAILY
Status: DISCONTINUED | OUTPATIENT
Start: 2023-05-12 | End: 2023-05-12 | Stop reason: HOSPADM

## 2023-05-12 RX ORDER — ENOXAPARIN SODIUM 150 MG/ML
1 INJECTION SUBCUTANEOUS 2 TIMES DAILY
Status: DISCONTINUED | OUTPATIENT
Start: 2023-05-12 | End: 2023-05-12

## 2023-05-12 RX ORDER — SPIRONOLACTONE 25 MG/1
25 TABLET ORAL DAILY
Status: DISCONTINUED | OUTPATIENT
Start: 2023-05-12 | End: 2023-05-12 | Stop reason: HOSPADM

## 2023-05-12 RX ORDER — FUROSEMIDE 40 MG/1
40 TABLET ORAL EVERY OTHER DAY
Qty: 30 TABLET | Refills: 0 | Status: SHIPPED | OUTPATIENT
Start: 2023-05-12

## 2023-05-12 RX ADMIN — SPIRONOLACTONE 25 MG: 25 TABLET ORAL at 13:56

## 2023-05-12 RX ADMIN — SACUBITRIL AND VALSARTAN 1 TABLET: 24; 26 TABLET, FILM COATED ORAL at 13:58

## 2023-05-12 RX ADMIN — FUROSEMIDE 40 MG: 10 INJECTION, SOLUTION INTRAMUSCULAR; INTRAVENOUS at 09:12

## 2023-05-12 RX ADMIN — ENOXAPARIN SODIUM 105 MG: 150 INJECTION SUBCUTANEOUS at 11:30

## 2023-05-12 RX ADMIN — HEPARIN SODIUM 14 UNITS/KG/HR: 10000 INJECTION, SOLUTION INTRAVENOUS at 09:30

## 2023-05-12 RX ADMIN — METOPROLOL TARTRATE 25 MG: 25 TABLET, FILM COATED ORAL at 09:12

## 2023-05-12 NOTE — PLAN OF CARE
Problem: Chronic Conditions and Co-morbidities  Goal: Patient's chronic conditions and co-morbidity symptoms are monitored and maintained or improved  5/12/2023 1405 by Jose Guadalupe Ray RN  Outcome: Progressing  5/12/2023 1335 by Paco Au RN  Outcome: Progressing  Flowsheets (Taken 5/12/2023 0800)  Care Plan - Patient's Chronic Conditions and Co-Morbidity Symptoms are Monitored and Maintained or Improved:   Monitor and assess patient's chronic conditions and comorbid symptoms for stability, deterioration, or improvement   Collaborate with multidisciplinary team to address chronic and comorbid conditions and prevent exacerbation or deterioration   Update acute care plan with appropriate goals if chronic or comorbid symptoms are exacerbated and prevent overall improvement and discharge  5/12/2023 0328 by Vivi Schmidt RN  Outcome: Progressing     Problem: Discharge Planning  Goal: Discharge to home or other facility with appropriate resources  5/12/2023 1405 by Jose Guadalupe Ray RN  Outcome: Progressing  5/12/2023 1335 by Paco Au RN  Outcome: Progressing  Flowsheets (Taken 5/12/2023 0800)  Discharge to home or other facility with appropriate resources:   Identify barriers to discharge with patient and caregiver   Arrange for needed discharge resources and transportation as appropriate   Identify discharge learning needs (meds, wound care, etc)   Refer to discharge planning if patient needs post-hospital services based on physician order or complex needs related to functional status, cognitive ability or social support system  5/12/2023 0328 by Vivi Schmidt RN  Outcome: Progressing     Problem: Safety - Adult  Goal: Free from fall injury  5/12/2023 1405 by Jose Guadalupe Ray RN  Outcome: Progressing  5/12/2023 1335 by Paco Au RN  Outcome: Progressing  5/12/2023 0328 by Vivi Schmidt RN  Outcome: Progressing     Problem: ABCDS Injury Assessment  Goal: Absence of physical injury  5/12/2023 1405 by

## 2023-05-12 NOTE — PROGRESS NOTES
Patient called out via the call light to ask RN for water. RN explained to patient that the patient is at the current fluid limit for the time being per the doctor's instructions. The patient became verbally aggressive and started yelling at this point in time. RN attempted to reconcile with the patient about the frustrations. Patient ignored RN's attempts. Patient stated, \"this is not how you treat people, I can do whatever I want and have whatever I want. If you won't give me more water, I am going to leave. \"  RN will continue to try to work with the patient through her frustrations. Dr. Juany Villareal advised at this time.

## 2023-05-12 NOTE — PROGRESS NOTES
[facility]   Progress Note                   Date:   5/12/2023  Patient name: Qamar Mckeon  Date of admission:  5/10/2023  1:43 PM  MRN:   [id number]  YOB: 1960  PCP: No primary care provider on file. Reason for Admission: New onset A-fib    Subjective:       Clinical Changes / Abnormalities:  Feeling better today. Less short of breath. No chest pain. Stable vitals. Remains in normal sinus rhythm      Medications:   Scheduled Meds:   enoxaparin  1 mg/kg SubCUTAneous BID    sodium chloride flush  5-40 mL IntraVENous 2 times per day    metoprolol tartrate  25 mg Oral BID    furosemide  40 mg IntraVENous BID     Continuous Infusions:   sodium chloride 10 mL/hr at 05/12/23 0522     CBC:   Recent Labs     05/10/23  1410 05/11/23  0450 05/12/23  0243   WBC 5.1 3.9 4.7   HGB 14.0 13.5 13.3    241 253     BMP:    Recent Labs     05/10/23  1410 05/11/23  0450    138   K 4.1 4.0    101   CO2 21 25   BUN 12 14   CREATININE 0.81 0.95   GLUCOSE 86 91     Hepatic:   Recent Labs     05/10/23  1610   AST 40*   ALT 29   BILITOT 1.9*   ALKPHOS 160*       INR:   Recent Labs     05/10/23  1705   INR 1.4       Objective:   Vitals: /89   Pulse 83   Temp 97.8 °F (36.6 °C) (Temporal)   Resp 18   Ht 6' 4\" (1.93 m)   Wt 244 lb 3.2 oz (110.8 kg)   SpO2 97%   BMI 29.72 kg/m²   General appearance: alert and cooperative with exam  HEENT: Head: Normocephalic, no lesions, without obvious abnormality. Neck: No JVD, supple.   Lungs: clear to auscultation bilaterally  Heart: regular rate and rhythm, S1, S2 normal, no murmur, click, rub or gallop  Abdomen: soft, non-tender; bowel sounds normal; no masses,  no organomegaly  Extremities: extremities normal, atraumatic, no cyanosis or edema  Neurologic: Mental status: Alert, oriented, thought content appropriate    Patient Active Problem List   Diagnosis    New onset atrial fibrillation (HCC)    Hypertension    Acute CHF (congestive

## 2023-05-12 NOTE — PROGRESS NOTES
End Of Shift Note  [address] CVICU  Summary of shift: Uneventful night. Patient had a RAYSA/CV yesterday. Currently remains in a NSR now. Heparin gtt currently infusing. AntiXa due at 9am. Still receiving IV lasix. Patient irritated at beginning of shift and refusing lab draws but eventually agreeable. Patient pleasant this morning. Ambulatory in room. Otherwise, patient did not want to be bothered throughout the night and slept. During hourly rounding, noted patient had chest rise and fall and all vitals remain stable.      Vitals:    Vitals:    05/11/23 2000 05/11/23 2038 05/12/23 0000 05/12/23 0400   BP: (!) 142/115 (!) 142/115 (!) 114/91 122/76   Pulse: 83 81 79 81   Resp: 16  18 16   Temp: 97.5 °F (36.4 °C)  97.7 °F (36.5 °C) 97.7 °F (36.5 °C)   TempSrc: Temporal  Temporal Temporal   SpO2: 93%  94% 95%   Weight:   244 lb 3.2 oz (110.8 kg)    Height:            I&O:   Intake/Output Summary (Last 24 hours) at 5/12/2023 0640  Last data filed at 5/12/2023 0522  Gross per 24 hour   Intake 763.2 ml   Output --   Net 763.2 ml       Resp Status: room air     Ventilator Settings:     / / /     Critical Care IV infusions:   sodium chloride 10 mL/hr at 05/12/23 0522    heparin (PORCINE) Infusion 14 Units/kg/hr (05/12/23 0522)        LDA:   Peripheral IV 05/10/23 Right Hand (Active)   Number of days: 1

## 2023-05-12 NOTE — FLOWSHEET NOTE
05/12/23 1311   Treatment Team Notification   Reason for Communication Review case   Team Member Name Dr. Nikunj Puckett Provider   Method of Communication Page   Response Waiting for response   Notification Time 22 [id number]     Cardiology, Dr. Shady Pizarro, paged to inquire if patient is appropriate for discharge, Awaiting reply. 1325: Dr. Shady Pizarro returned page. Writer explained purpose of call. Dr. Shady Pizarro is [de-identified]' with patient being discharged. Dr. Ar Mcclain, contacted via secure message, and informed of this update.

## 2023-05-12 NOTE — PLAN OF CARE
Problem: Chronic Conditions and Co-morbidities  Goal: Patient's chronic conditions and co-morbidity symptoms are monitored and maintained or improved  5/12/2023 0328 by Ariel Payne RN  Outcome: Progressing  5/11/2023 1443 by Kun Randolph RN  Outcome: Progressing  Flowsheets (Taken 5/11/2023 0800)  Care Plan - Patient's Chronic Conditions and Co-Morbidity Symptoms are Monitored and Maintained or Improved:   Update acute care plan with appropriate goals if chronic or comorbid symptoms are exacerbated and prevent overall improvement and discharge   Collaborate with multidisciplinary team to address chronic and comorbid conditions and prevent exacerbation or deterioration   Monitor and assess patient's chronic conditions and comorbid symptoms for stability, deterioration, or improvement     Problem: Discharge Planning  Goal: Discharge to home or other facility with appropriate resources  5/12/2023 0328 by Ariel Payne RN  Outcome: Progressing  5/11/2023 1443 by Kun Randolph RN  Outcome: Progressing  Flowsheets (Taken 5/11/2023 0800)  Discharge to home or other facility with appropriate resources:   Identify barriers to discharge with patient and caregiver   Arrange for needed discharge resources and transportation as appropriate   Identify discharge learning needs (meds, wound care, etc)   Arrange for interpreters to assist at discharge as needed   Refer to discharge planning if patient needs post-hospital services based on physician order or complex needs related to functional status, cognitive ability or social support system     Problem: Safety - Adult  Goal: Free from fall injury  5/12/2023 0328 by Ariel Payne RN  Outcome: Progressing  5/11/2023 1443 by Kun Randolph RN  Outcome: Progressing  Flowsheets (Taken 5/11/2023 0800)  Free From Fall Injury: Instruct family/caregiver on patient safety     Problem: ABCDS Injury Assessment  Goal: Absence of physical injury  5/12/2023 0328 by Ariel Payne

## 2023-05-12 NOTE — PROGRESS NOTES
Discharge Note:     All discharge instructions given at this time. Patient left with all personal belongings inclusive of wallet, cell phone, and cell phone . Full education and discussion was completed on all new medications. Handouts were provided as well as opportunity for questions. Patient verbalized understanding of all instructions given. Patient denies any further questions regarding discharge and stated that he felt \"OK\" with discharge. Patient denies any further issues at this time. Patient escorted to ER, outside which he had parked his personal vehicle. Pt left premises without any issues in private vehicle at this time.

## 2023-05-12 NOTE — DISCHARGE SUMMARY
[facility]  Office: [address], DO, Sherryfloryada Shaver, DO, Ann Jo, DO, Onzac Bobby Blood, DO, Jasmyn Carty MD, Moisés Armstrong MD, Simin David MD, Tim Karimi MD,  Heyid Armando MD, Marisa Gomez MD, Roberto Trevizo, DO, Deepali Begum MD,  Chinedu Gonzalez MD, Rica Dhillon MD, Mali Sifuentes DO, Keven Lama MD, Conchis Alford MD, Paulita Mortimer, DO, Airam Wing MD, Maurice Gregg MD, Gaurav Regan MD, Kacy Le MD,  Kristi Mendiola DO, Hola Farias MD,  Lazaro Cameron, CNP,  Rich Estrada, CNP, Riki Nissen, CNP, Josephine Ramírez, CNP,  Ezra Cameron, [facility], Rani Zaragoza, CNP, Brian Gabriel, CNP, Sterling Busch, CNP, Artur Warren, CNP, Marybeth Posey, CNP, Zoë Harvey PA-C, Josse Rolon, CNS, Olamide Greenwood, CNP, Ismael Desai, [facility]         [address]    Discharge Summary     Patient ID: Driss Roque  :  1960   MRN: [id number]     ACCOUNT:  [de-identified]   Patient's PCP: No primary care provider on file. Admit Date: 5/10/2023   Discharge Date: 2023     Length of Stay: 2  Code Status:  Full Code  Admitting Physician: Deepali Begum MD  Discharge Physician: Aniceto Drake DO     Active Discharge Diagnoses:     Hospital Problem Lists:  Principal Problem:    New onset atrial fibrillation ([facility] [address]  Active Problems:    Hypertension    Acute CHF (congestive heart failure) ([facility] [address]  Resolved Problems:    * No resolved hospital problems. *      Admission Condition:  stable     Discharged Condition: stable    Hospital Stay:     Hospital Course:  Driss Roque is a 58-year-old male with a history of alcohol use who presents to our hospital at the request of his primary care physician for evaluation after he was found to have bilateral lower extremity edema and atrial fibrillation with RVR in the clinic.   Echocardiogram showed ejection fraction of 40% with left atrial

## 2023-05-12 NOTE — PLAN OF CARE
Problem: Chronic Conditions and Co-morbidities  Goal: Patient's chronic conditions and co-morbidity symptoms are monitored and maintained or improved  5/12/2023 1605 by Mark Knight RN  Outcome: Adequate for Discharge  5/12/2023 1405 by Mark Knight RN  Outcome: Progressing  5/12/2023 1335 by Oscar Martins RN  Outcome: Progressing  Flowsheets (Taken 5/12/2023 0800)  Care Plan - Patient's Chronic Conditions and Co-Morbidity Symptoms are Monitored and Maintained or Improved:   Monitor and assess patient's chronic conditions and comorbid symptoms for stability, deterioration, or improvement   Collaborate with multidisciplinary team to address chronic and comorbid conditions and prevent exacerbation or deterioration   Update acute care plan with appropriate goals if chronic or comorbid symptoms are exacerbated and prevent overall improvement and discharge  5/12/2023 0328 by Lisy Brian RN  Outcome: Progressing     Problem: Safety - Adult  Goal: Free from fall injury  5/12/2023 1605 by Mark Knight RN  Outcome: Adequate for Discharge  5/12/2023 1405 by Mark Knight RN  Outcome: Progressing  5/12/2023 1335 by Oscar Martins RN  Outcome: Progressing  Flowsheets (Taken 5/12/2023 0800 by Mark Knight RN)  Free From Fall Injury: Instruct family/caregiver on patient safety  5/12/2023 0328 by Lisy Brian RN  Outcome: Progressing     Problem: ABCDS Injury Assessment  Goal: Absence of physical injury  5/12/2023 1605 by Mark Knight RN  Outcome: Adequate for Discharge  5/12/2023 1405 by Mark Knight RN  Outcome: Progressing  5/12/2023 1335 by Oscar Martins RN  Outcome: Progressing  Flowsheets (Taken 5/12/2023 0800 by Mark Knight RN)  Absence of Physical Injury: Implement safety measures based on patient assessment  5/12/2023 0328 by Lisy Brian RN  Outcome: Progressing

## 2023-05-12 NOTE — DISCHARGE INSTRUCTIONS
-Follow up with your primary care physician within one week of discharge for follow up   -Needs repeat BMP in one weeks time to reevaluate Scr and potassium after being started on Entresto/Lasix  -Needs to follow up with cardiology for titration of GDMT  -Return to hospital if you develop any chest pain, SOB difficulty breathing,  -Weigh yourself daily at home, speak with your PCP if you gain more than 3 pounds in a day or 5 pounds in a week, you will likely need to adjust dose of lasix if youre gaining weight  -Check blood pressure daily, do not take medications if Systolic blood pressure is <100

## 2023-05-12 NOTE — PROGRESS NOTES
CLINICAL PHARMACY NOTE: MEDS TO BEDS    Total # of Prescriptions Filled: 4   The following medications were delivered to the patient:  ENTRESTO 24-26 MG  METOPROLOL SUCC ER 50MG  ELIQUIS 5MG  FUROSEMIDE 40MG    Additional Documentation:

## 2023-05-12 NOTE — PROGRESS NOTES
[facility]  Office: [address], DO, Beth Chaudhry, DO, Ju De Souza, DO, Clara Dooley Juan, DO, Rebecca Malcolm MD, Neena Arauz MD, Ascencion Mauricio MD, Mohamud Navarro MD,  Madina Delvalle MD, Taya Torres MD, Wade Ellison, DO, Jazmín Jimenez MD,  Bessy Ortiz MD, Iker Knight MD, Cuca Curry DO, Anna Du MD, Annie Collins MD, Torito Grey DO, Jose Malone MD, Chayo Aguirre MD, Germain Edwarsd MD, Berna Wilburn MD,  Nael Chaudhry DO, Arpit Logan MD,  Ana Morales, CNP,  Sabino Marc, CNP, Don Wright, CNP, Zari Garcia, CNP,  Anupam Arellano, [facility], Sera Gonzalez, CNP, Rosa Beltrán, CNP, Doc Tomas, CNP, Kenyetta Hill, CNP, Nusrat Reddy, CNP, Rj Duque PA-C, Dane Navarro, CNS, Yuridia Remy, [facility], Lena Roy, [facility]         [address]    Progress Note    5/12/2023    9:42 AM    Name:   Leroy Aleman  MRN:     [id number]     Acct:      [de-identified]   Room:   2042/204201   Day:  2  Admit Date:  5/10/2023  1:43 PM    PCP:   No primary care provider on file. Code Status:  Full Code    Subjective:     Patient feels much better today. He has no chest pain, shortness breath, difficulty breathing. He wants to go home. He has no swelling. He is able to ambulate, any shortness of breath      Brief History: This is a 58-year-old male with a history of alcohol use who presents to our hospital at the request of his primary care physician for evaluation after he was found to have bilateral lower extremity edema and atrial fibrillation with RVR in the clinic. Echocardiogram showed ejection fraction of 40% with left atrial moderately dilatated and moderate mitral regurgitation with mild tricuspid regurgitation. Patient was seen by cardiology and underwent RAYSA cardioversion with return to normal sinus rhythm.   Cardiology started him on Eliquis, Lasix, metoprolol,

## 2023-05-12 NOTE — PLAN OF CARE
Problem: Chronic Conditions and Co-morbidities  Goal: Patient's chronic conditions and co-morbidity symptoms are monitored and maintained or improved  5/12/2023 1405 by Lucretia Petty RN  Outcome: Progressing  5/12/2023 1335 by Kristi Arguelles RN  Outcome: Progressing  Flowsheets (Taken 5/12/2023 0800)  Care Plan - Patient's Chronic Conditions and Co-Morbidity Symptoms are Monitored and Maintained or Improved:   Monitor and assess patient's chronic conditions and comorbid symptoms for stability, deterioration, or improvement   Collaborate with multidisciplinary team to address chronic and comorbid conditions and prevent exacerbation or deterioration   Update acute care plan with appropriate goals if chronic or comorbid symptoms are exacerbated and prevent overall improvement and discharge  5/12/2023 0328 by Erlinda Liang RN  Outcome: Progressing     Problem: Safety - Adult  Goal: Free from fall injury  5/12/2023 1405 by Lucretia Petty RN  Outcome: Progressing  5/12/2023 1335 by Kristi Arguelles RN  Outcome: Progressing  5/12/2023 0328 by Erlinda Liang RN  Outcome: Progressing     Problem: ABCDS Injury Assessment  Goal: Absence of physical injury  5/12/2023 1405 by Lucretia Petty RN  Outcome: Progressing  5/12/2023 1335 by Kristi Arguelles RN  Outcome: Progressing  5/12/2023 0328 by Erlinda Liang RN  Outcome: Progressing

## 2023-05-12 NOTE — PLAN OF CARE
Problem: Chronic Conditions and Co-morbidities  Goal: Patient's chronic conditions and co-morbidity symptoms are monitored and maintained or improved  5/12/2023 1335 by Liza Caldwell RN  Outcome: Progressing  Flowsheets (Taken 5/12/2023 0800)  Care Plan - Patient's Chronic Conditions and Co-Morbidity Symptoms are Monitored and Maintained or Improved:   Monitor and assess patient's chronic conditions and comorbid symptoms for stability, deterioration, or improvement   Collaborate with multidisciplinary team to address chronic and comorbid conditions and prevent exacerbation or deterioration   Update acute care plan with appropriate goals if chronic or comorbid symptoms are exacerbated and prevent overall improvement and discharge  5/12/2023 0328 by Tommy Oneal RN  Outcome: Progressing     Problem: Discharge Planning  Goal: Discharge to home or other facility with appropriate resources  5/12/2023 1335 by Liza Caldwell RN  Outcome: Progressing  Flowsheets (Taken 5/12/2023 0800)  Discharge to home or other facility with appropriate resources:   Identify barriers to discharge with patient and caregiver   Arrange for needed discharge resources and transportation as appropriate   Identify discharge learning needs (meds, wound care, etc)   Refer to discharge planning if patient needs post-hospital services based on physician order or complex needs related to functional status, cognitive ability or social support system  5/12/2023 0328 by Tommy Oneal RN  Outcome: Progressing     Problem: Safety - Adult  Goal: Free from fall injury  5/12/2023 1335 by Liza Caldwell RN  Outcome: Progressing  5/12/2023 0328 by Tommy Oneal RN  Outcome: Progressing     Problem: ABCDS Injury Assessment  Goal: Absence of physical injury  5/12/2023 1335 by Liza Caldwell RN  Outcome: Progressing  5/12/2023 0328 by Tommy Oneal RN  Outcome: Progressing     Problem: Cardiovascular - Adult  Goal: Maintains optimal cardiac output and

## 2023-06-23 PROBLEM — R94.31 ABNORMAL ECG: Status: ACTIVE | Noted: 2023-06-23

## 2023-06-23 PROBLEM — I50.22 CHRONIC SYSTOLIC HEART FAILURE (HCC): Status: ACTIVE | Noted: 2023-06-23

## 2023-06-23 PROBLEM — Z79.01 ANTICOAGULATED: Status: ACTIVE | Noted: 2023-06-23

## 2023-06-23 PROBLEM — I48.0 PAROXYSMAL ATRIAL FIBRILLATION (HCC): Status: ACTIVE | Noted: 2023-06-23

## 2023-06-23 PROBLEM — I10 ESSENTIAL HYPERTENSION: Status: ACTIVE | Noted: 2023-06-23

## 2023-06-23 PROBLEM — E78.2 MIXED HYPERLIPIDEMIA: Status: ACTIVE | Noted: 2023-06-23

## 2025-02-25 ENCOUNTER — HOSPITAL ENCOUNTER (INPATIENT)
Age: 65
LOS: 1 days | Discharge: HOME | DRG: 309 | End: 2025-02-26
Attending: EMERGENCY MEDICINE | Admitting: FAMILY MEDICINE
Payer: COMMERCIAL

## 2025-02-25 ENCOUNTER — APPOINTMENT (OUTPATIENT)
Dept: GENERAL RADIOLOGY | Age: 65
DRG: 309 | End: 2025-02-25
Payer: COMMERCIAL

## 2025-02-25 DIAGNOSIS — I48.91 ATRIAL FIBRILLATION WITH RVR (HCC): Primary | ICD-10-CM

## 2025-02-25 DIAGNOSIS — R42 LIGHTHEADEDNESS: ICD-10-CM

## 2025-02-25 LAB
ANION GAP SERPL CALCULATED.3IONS-SCNC: 16 MMOL/L (ref 9–16)
BASOPHILS # BLD: 0.11 K/UL (ref 0–0.2)
BASOPHILS NFR BLD: 2 % (ref 0–2)
BUN SERPL-MCNC: 21 MG/DL (ref 8–23)
CALCIUM SERPL-MCNC: 9.3 MG/DL (ref 8.8–10.2)
CHLORIDE SERPL-SCNC: 102 MMOL/L (ref 98–107)
CO2 SERPL-SCNC: 18 MMOL/L (ref 20–31)
CREAT SERPL-MCNC: 1.2 MG/DL (ref 0.7–1.2)
EKG Q-T INTERVAL: 322 MS
EKG QRS DURATION: 92 MS
EKG QTC CALCULATION (BAZETT): 419 MS
EKG R AXIS: 34 DEGREES
EKG T AXIS: 50 DEGREES
EKG VENTRICULAR RATE: 102 BPM
EOSINOPHIL # BLD: 0.26 K/UL (ref 0–0.44)
EOSINOPHILS RELATIVE PERCENT: 4 % (ref 1–4)
ERYTHROCYTE [DISTWIDTH] IN BLOOD BY AUTOMATED COUNT: 14.3 % (ref 11.8–14.4)
GFR, ESTIMATED: 68 ML/MIN/1.73M2
GLUCOSE SERPL-MCNC: 115 MG/DL (ref 82–115)
HCT VFR BLD AUTO: 42.7 % (ref 40.7–50.3)
HGB BLD-MCNC: 15.1 G/DL (ref 13–17)
IMM GRANULOCYTES # BLD AUTO: 0.01 K/UL (ref 0–0.3)
IMM GRANULOCYTES NFR BLD: 0 %
INR PPP: 1.3
LYMPHOCYTES NFR BLD: 1.42 K/UL (ref 1.1–3.7)
LYMPHOCYTES RELATIVE PERCENT: 20 % (ref 24–43)
MAGNESIUM SERPL-MCNC: 1.8 MG/DL (ref 1.6–2.4)
MCH RBC QN AUTO: 34.1 PG (ref 25.2–33.5)
MCHC RBC AUTO-ENTMCNC: 35.4 G/DL (ref 28.4–34.8)
MCV RBC AUTO: 96.4 FL (ref 82.6–102.9)
MONOCYTES NFR BLD: 0.67 K/UL (ref 0.1–1.2)
MONOCYTES NFR BLD: 9 % (ref 3–12)
NEUTROPHILS NFR BLD: 65 % (ref 36–65)
NEUTS SEG NFR BLD: 4.67 K/UL (ref 1.5–8.1)
NRBC BLD-RTO: 0 PER 100 WBC
PARTIAL THROMBOPLASTIN TIME: 31 SEC (ref 23.9–33.8)
PHOSPHATE SERPL-MCNC: 2.4 MG/DL (ref 2.5–4.5)
PLATELET # BLD AUTO: 347 K/UL (ref 138–453)
PMV BLD AUTO: 10.7 FL (ref 8.1–13.5)
POTASSIUM SERPL-SCNC: 4 MMOL/L (ref 3.7–5.3)
PROTHROMBIN TIME: 16.2 SEC (ref 11.5–14.2)
RBC # BLD AUTO: 4.43 M/UL (ref 4.21–5.77)
SODIUM SERPL-SCNC: 136 MMOL/L (ref 136–145)
TROPONIN I SERPL HS-MCNC: 13 NG/L (ref 0–22)
TROPONIN I SERPL HS-MCNC: 15 NG/L (ref 0–22)
WBC OTHER # BLD: 7.1 K/UL (ref 3.5–11.3)

## 2025-02-25 PROCEDURE — 85610 PROTHROMBIN TIME: CPT

## 2025-02-25 PROCEDURE — 6370000000 HC RX 637 (ALT 250 FOR IP): Performed by: FAMILY MEDICINE

## 2025-02-25 PROCEDURE — 85730 THROMBOPLASTIN TIME PARTIAL: CPT

## 2025-02-25 PROCEDURE — 85025 COMPLETE CBC W/AUTO DIFF WBC: CPT

## 2025-02-25 PROCEDURE — 2060000000 HC ICU INTERMEDIATE R&B

## 2025-02-25 PROCEDURE — 93005 ELECTROCARDIOGRAM TRACING: CPT | Performed by: EMERGENCY MEDICINE

## 2025-02-25 PROCEDURE — 2500000003 HC RX 250 WO HCPCS: Performed by: EMERGENCY MEDICINE

## 2025-02-25 PROCEDURE — 84100 ASSAY OF PHOSPHORUS: CPT

## 2025-02-25 PROCEDURE — 99285 EMERGENCY DEPT VISIT HI MDM: CPT

## 2025-02-25 PROCEDURE — 84484 ASSAY OF TROPONIN QUANT: CPT

## 2025-02-25 PROCEDURE — 71045 X-RAY EXAM CHEST 1 VIEW: CPT

## 2025-02-25 PROCEDURE — 83735 ASSAY OF MAGNESIUM: CPT

## 2025-02-25 PROCEDURE — 80048 BASIC METABOLIC PNL TOTAL CA: CPT

## 2025-02-25 RX ORDER — POTASSIUM CHLORIDE 7.45 MG/ML
10 INJECTION INTRAVENOUS PRN
Status: DISCONTINUED | OUTPATIENT
Start: 2025-02-25 | End: 2025-02-26 | Stop reason: HOSPADM

## 2025-02-25 RX ORDER — SODIUM CHLORIDE 0.9 % (FLUSH) 0.9 %
5-40 SYRINGE (ML) INJECTION EVERY 12 HOURS SCHEDULED
Status: DISCONTINUED | OUTPATIENT
Start: 2025-02-25 | End: 2025-02-26 | Stop reason: HOSPADM

## 2025-02-25 RX ORDER — ONDANSETRON 4 MG/1
4 TABLET, ORALLY DISINTEGRATING ORAL EVERY 8 HOURS PRN
Status: DISCONTINUED | OUTPATIENT
Start: 2025-02-25 | End: 2025-02-26 | Stop reason: HOSPADM

## 2025-02-25 RX ORDER — ACETAMINOPHEN 325 MG/1
650 TABLET ORAL EVERY 6 HOURS PRN
Status: DISCONTINUED | OUTPATIENT
Start: 2025-02-25 | End: 2025-02-26 | Stop reason: HOSPADM

## 2025-02-25 RX ORDER — SODIUM CHLORIDE 9 MG/ML
INJECTION, SOLUTION INTRAVENOUS PRN
Status: DISCONTINUED | OUTPATIENT
Start: 2025-02-25 | End: 2025-02-26 | Stop reason: HOSPADM

## 2025-02-25 RX ORDER — POTASSIUM CHLORIDE 1500 MG/1
40 TABLET, EXTENDED RELEASE ORAL PRN
Status: DISCONTINUED | OUTPATIENT
Start: 2025-02-25 | End: 2025-02-26 | Stop reason: HOSPADM

## 2025-02-25 RX ORDER — LEFLUNOMIDE 10 MG/1
20 TABLET ORAL DAILY
Status: DISCONTINUED | OUTPATIENT
Start: 2025-02-25 | End: 2025-02-25

## 2025-02-25 RX ORDER — MAGNESIUM SULFATE IN WATER 40 MG/ML
2000 INJECTION, SOLUTION INTRAVENOUS PRN
Status: DISCONTINUED | OUTPATIENT
Start: 2025-02-25 | End: 2025-02-26 | Stop reason: HOSPADM

## 2025-02-25 RX ORDER — ONDANSETRON 2 MG/ML
4 INJECTION INTRAMUSCULAR; INTRAVENOUS EVERY 6 HOURS PRN
Status: DISCONTINUED | OUTPATIENT
Start: 2025-02-25 | End: 2025-02-26 | Stop reason: HOSPADM

## 2025-02-25 RX ORDER — ACETAMINOPHEN 650 MG/1
650 SUPPOSITORY RECTAL EVERY 6 HOURS PRN
Status: DISCONTINUED | OUTPATIENT
Start: 2025-02-25 | End: 2025-02-26 | Stop reason: HOSPADM

## 2025-02-25 RX ORDER — SPIRONOLACTONE 50 MG/1
50 TABLET, FILM COATED ORAL DAILY
COMMUNITY

## 2025-02-25 RX ORDER — SODIUM CHLORIDE 0.9 % (FLUSH) 0.9 %
5-40 SYRINGE (ML) INJECTION PRN
Status: DISCONTINUED | OUTPATIENT
Start: 2025-02-25 | End: 2025-02-26 | Stop reason: HOSPADM

## 2025-02-25 RX ORDER — NAPROXEN 500 MG/1
500 TABLET ORAL 2 TIMES DAILY PRN
COMMUNITY

## 2025-02-25 RX ORDER — METOPROLOL SUCCINATE 50 MG/1
100 TABLET, EXTENDED RELEASE ORAL DAILY
Status: DISCONTINUED | OUTPATIENT
Start: 2025-02-25 | End: 2025-02-26 | Stop reason: HOSPADM

## 2025-02-25 RX ORDER — POLYETHYLENE GLYCOL 3350 17 G/17G
17 POWDER, FOR SOLUTION ORAL DAILY PRN
Status: DISCONTINUED | OUTPATIENT
Start: 2025-02-25 | End: 2025-02-26 | Stop reason: HOSPADM

## 2025-02-25 RX ADMIN — AMIODARONE HYDROCHLORIDE 150 MG: 1.5 INJECTION, SOLUTION INTRAVENOUS at 13:19

## 2025-02-25 RX ADMIN — AMIODARONE HYDROCHLORIDE 0.5 MG/MIN: 1.8 INJECTION, SOLUTION INTRAVENOUS at 18:26

## 2025-02-25 RX ADMIN — AMIODARONE HYDROCHLORIDE 1 MG/MIN: 1.8 INJECTION, SOLUTION INTRAVENOUS at 13:35

## 2025-02-25 RX ADMIN — APIXABAN 5 MG: 5 TABLET, FILM COATED ORAL at 20:13

## 2025-02-25 ASSESSMENT — HEART SCORE: ECG: NORMAL

## 2025-02-25 ASSESSMENT — ENCOUNTER SYMPTOMS
NAUSEA: 0
COUGH: 0
DIARRHEA: 0
ABDOMINAL PAIN: 0
VOMITING: 0
PHOTOPHOBIA: 0
SHORTNESS OF BREATH: 0
COLOR CHANGE: 0
TROUBLE SWALLOWING: 0

## 2025-02-25 ASSESSMENT — PAIN - FUNCTIONAL ASSESSMENT: PAIN_FUNCTIONAL_ASSESSMENT: NONE - DENIES PAIN

## 2025-02-25 ASSESSMENT — PAIN SCALES - GENERAL: PAINLEVEL_OUTOF10: 0

## 2025-02-25 NOTE — PROGRESS NOTES
[x] The Home Med List was verified for accuracy and marked COMPLETED. The following sources were used to assist with Medication Reconciliation:    [] Med Rec Pharmacy tech has already completed home med list in the ED and note reviewed   [] Patient med list was transcribed into the EHR and verified for accuracy.(Note method of verification)  [] Patient provided bottles of their medications for validation  [x] Medications reviewed and confirmed with John Johansen. patient (Please list name and relationship to patient)  [] Contacted patient's pharmacy to confirm home medications (Please list the pharmacy)  [] Home medications reviewed using Dispense Report   [] Contacted physician office to confirm home medications  [] Medical Records received from another facility (list facility and place copy placed in chart)  []   was notified regarding changes to home med list via  on 2/25/2025 at the following time:        [] There are one or more home medications that need clarification before Medication Reconciliation can be marked completed. The Med List Status has been marked as In Progress.   To assist with Home Medication Reconciliation the following actions have been taken:    [] Family requested to bring medications in their original bottles to the hospital for verification  [] Family requested to call hospital with list of medications  [] Call to physicians off and left message (list physician and who they spoke to, date and time)  [] Request for medical records made to   [] MAR from facility requested to be faxed over  [] Unable to complete due to patient condition  [] Oncoming nurse  notified of incomplete med list for follow up  [] Other       *Effective communication is essential throughout this process. It is important for the nurse to document the progress of the med rec to keep team members informed. If any changes are made to the home medication list, the nurse is responsible for notifying the physician of the

## 2025-02-25 NOTE — ED NOTES
Pt comes to the ED c/o lightheadedness and dizziness that started Thursday. Pt developed dizziness and has worsened over the last few days. Pt reports that it took all of his energy to shower this morning when he is normally very active. Pt also reports nausea, denies vomiting. Pt is A&O x4, able to answer all questions appropriately, speech clear. Pt reports that he is on Eliquis. Pt denies any sob, chest pain.

## 2025-02-25 NOTE — PROGRESS NOTES
Pharmacy Medication Review    The patient's list of current home medications has been reviewed.     PHYSICIANS AND NURSE PRACTITIONERS: please note there is no Transitions of Care/Med Rec Pharmacist available to address any discrepancies on inpatient orders. It is the responsibility of the attending provider to review the updates made to the home med list and adjust inpatient orders as appropriate.        Source(s) of information:patient, Surescripts refill report, personal medication list        Based on information provided by the above source(s), I have updated the patient's home med list as described below.     Removed   amiodarone (PACERONE) 100 MG tablet   spironolactone (ALDACTONE) 25 MG tablet   sulfaSALAzine (AZULFIDINE) 500 MG tablet      Added spironolactone (ALDACTONE) 50 MG tablet   naproxen (NAPROSYN) 500 MG tablet      Adjusted   leflunomide (ARAVA) 20 MG tablet     Other notes:   None    Are any of the medications noted above considered a 'high alert' medication? YES      Is the patient on warfarin at home? No          Please feel free to call me with any questions about this encounter. Thank you.      Jovita Robb, pharmacy technician  [facility]  Phone:  [phone]      Electronically signed by Jovita Robb on 2/25/2025 at 5:11 PM   Note: co-signature by the pharmacist only acknowledges that I have performed a medication review and does not attest to an evaluation of this medication review.      Prior to Admission medications    Medication Sig   spironolactone (ALDACTONE) 50 MG tablet Take 1 tablet by mouth daily   naproxen (NAPROSYN) 500 MG tablet   Take 1 tablet by mouth 2 times daily as needed for Pain   furosemide (LASIX) 40 MG tablet  Take 1 tablet by mouth as needed (when legs swelling)     metoprolol succinate (TOPROL XL) 100 MG extended release tablet take 1 tablet by mouth once daily   sacubitril-valsartan (ENTRESTO) 24-26 MG per tablet Take 1 tablet by mouth 2 times

## 2025-02-25 NOTE — PROGRESS NOTES
Admitted from ER  to room  2032  .   Pt alert and oriented.   Pt oriented to call light system and agreeable to call for assistance.    Family at bed side  Vital signs recorded    Telemetry monitor applied.   Admission documentation completed  Home Meds    Pt on amino drip for Afib RVR

## 2025-02-25 NOTE — ED PROVIDER NOTES
EKG was reviewed and interpreted by myself, the ED physician.  Patient was admitted after speaking with the admitting team.  Patient understands and agrees with the plan.    Patient does utilize Eliquis.    CRITICAL CARE:       PROCEDURES:    Procedures      DATA FOR LAB AND RADIOLOGY TESTS ORDERED BELOW ARE REVIEWED BY THE ED CLINICIAN:    RADIOLOGY: All x-rays, CT, MRI, and formal ultrasound images (except ED bedside ultrasound) are read by the radiologist, see reports below, unless otherwise noted in MDM or here.  Reports below are reviewed by myself.  XR CHEST PORTABLE   Final Result   No acute process.             LABS: Lab orders shown below, the results are reviewed by myself, and all abnormals are listed below.  Labs Reviewed   PROTIME-INR - Abnormal; Notable for the following components:       Result Value    Protime 16.2 (*)     All other components within normal limits   PHOSPHORUS - Abnormal; Notable for the following components:    Phosphorus 2.4 (*)     All other components within normal limits   BASIC METABOLIC PANEL - Abnormal; Notable for the following components:    CO2 18 (*)     All other components within normal limits   CBC WITH AUTO DIFFERENTIAL - Abnormal; Notable for the following components:    MCH 34.1 (*)     MCHC 35.4 (*)     Lymphocytes % 20 (*)     All other components within normal limits   TROPONIN   APTT   MAGNESIUM   TROPONIN       Vitals Reviewed:    Vitals:    02/25/25 1117 02/25/25 1124 02/25/25 1138 02/25/25 1145   BP: 127/80   112/82   Pulse: (!) 110  (!) 101 (!) 102   Resp:   14 17   Temp:  97.5 °F (36.4 °C)     TempSrc:  Oral     SpO2: 96%  94% 95%   Weight:       Height:  1.93 m (6' 4\")       MEDICATIONS GIVEN TO PATIENT THIS ENCOUNTER:  Orders Placed This Encounter   Medications    FOLLOWED BY Linked Order Group     amiodarone (NEXTERONE) 150 mg in dextrose 5% 100 ml     amiodarone (NEXTERONE) 360 mg in dextrose 5% 200 ml     amiodarone (NEXTERONE) 360 mg in dextrose 5%

## 2025-02-26 VITALS
BODY MASS INDEX: 28.86 KG/M2 | WEIGHT: 237 LBS | HEIGHT: 76 IN | DIASTOLIC BLOOD PRESSURE: 76 MMHG | RESPIRATION RATE: 11 BRPM | HEART RATE: 74 BPM | SYSTOLIC BLOOD PRESSURE: 111 MMHG | OXYGEN SATURATION: 95 % | TEMPERATURE: 97.9 F

## 2025-02-26 LAB
ANION GAP SERPL CALCULATED.3IONS-SCNC: 11 MMOL/L (ref 9–16)
BASOPHILS # BLD: 0.13 K/UL (ref 0–0.2)
BASOPHILS NFR BLD: 2 % (ref 0–2)
BUN SERPL-MCNC: 16 MG/DL (ref 8–23)
CALCIUM SERPL-MCNC: 9.3 MG/DL (ref 8.8–10.2)
CHLORIDE SERPL-SCNC: 103 MMOL/L (ref 98–107)
CO2 SERPL-SCNC: 24 MMOL/L (ref 20–31)
CREAT SERPL-MCNC: 1.1 MG/DL (ref 0.7–1.2)
EOSINOPHIL # BLD: 0.29 K/UL (ref 0–0.44)
EOSINOPHILS RELATIVE PERCENT: 5 % (ref 1–4)
ERYTHROCYTE [DISTWIDTH] IN BLOOD BY AUTOMATED COUNT: 14.6 % (ref 11.8–14.4)
GFR, ESTIMATED: 77 ML/MIN/1.73M2
GLUCOSE SERPL-MCNC: 104 MG/DL (ref 82–115)
HCT VFR BLD AUTO: 43.3 % (ref 40.7–50.3)
HGB BLD-MCNC: 14.7 G/DL (ref 13–17)
IMM GRANULOCYTES # BLD AUTO: 0.01 K/UL (ref 0–0.3)
IMM GRANULOCYTES NFR BLD: 0 %
LACTATE BLDV-SCNC: 0.9 MMOL/L (ref 0.5–2.2)
LYMPHOCYTES NFR BLD: 1.07 K/UL (ref 1.1–3.7)
LYMPHOCYTES RELATIVE PERCENT: 17 % (ref 24–43)
MCH RBC QN AUTO: 33.4 PG (ref 25.2–33.5)
MCHC RBC AUTO-ENTMCNC: 33.9 G/DL (ref 28.4–34.8)
MCV RBC AUTO: 98.4 FL (ref 82.6–102.9)
MONOCYTES NFR BLD: 0.65 K/UL (ref 0.1–1.2)
MONOCYTES NFR BLD: 10 % (ref 3–12)
NEUTROPHILS NFR BLD: 66 % (ref 36–65)
NEUTS SEG NFR BLD: 4.25 K/UL (ref 1.5–8.1)
NRBC BLD-RTO: 0 PER 100 WBC
PHOSPHATE SERPL-MCNC: 3.2 MG/DL (ref 2.5–4.5)
PLATELET # BLD AUTO: 346 K/UL (ref 138–453)
PMV BLD AUTO: 10.9 FL (ref 8.1–13.5)
POTASSIUM SERPL-SCNC: 4.9 MMOL/L (ref 3.7–5.3)
RBC # BLD AUTO: 4.4 M/UL (ref 4.21–5.77)
RBC # BLD: ABNORMAL 10*6/UL
SODIUM SERPL-SCNC: 138 MMOL/L (ref 136–145)
WBC OTHER # BLD: 6.4 K/UL (ref 3.5–11.3)

## 2025-02-26 PROCEDURE — 94761 N-INVAS EAR/PLS OXIMETRY MLT: CPT

## 2025-02-26 PROCEDURE — 84100 ASSAY OF PHOSPHORUS: CPT

## 2025-02-26 PROCEDURE — 83605 ASSAY OF LACTIC ACID: CPT

## 2025-02-26 PROCEDURE — 2500000003 HC RX 250 WO HCPCS: Performed by: FAMILY MEDICINE

## 2025-02-26 PROCEDURE — 2500000003 HC RX 250 WO HCPCS: Performed by: EMERGENCY MEDICINE

## 2025-02-26 PROCEDURE — 36415 COLL VENOUS BLD VENIPUNCTURE: CPT

## 2025-02-26 PROCEDURE — 85025 COMPLETE CBC W/AUTO DIFF WBC: CPT

## 2025-02-26 PROCEDURE — 80048 BASIC METABOLIC PNL TOTAL CA: CPT

## 2025-02-26 PROCEDURE — 6370000000 HC RX 637 (ALT 250 FOR IP): Performed by: FAMILY MEDICINE

## 2025-02-26 PROCEDURE — 6370000000 HC RX 637 (ALT 250 FOR IP): Performed by: NURSE PRACTITIONER

## 2025-02-26 RX ORDER — AMIODARONE HYDROCHLORIDE 200 MG/1
200 TABLET ORAL 2 TIMES DAILY
Status: DISCONTINUED | OUTPATIENT
Start: 2025-02-26 | End: 2025-02-26 | Stop reason: HOSPADM

## 2025-02-26 RX ORDER — AMIODARONE HYDROCHLORIDE 200 MG/1
200 TABLET ORAL 2 TIMES DAILY
Qty: 60 TABLET | Refills: 0 | Status: SHIPPED | OUTPATIENT
Start: 2025-02-26 | End: 2025-03-28

## 2025-02-26 RX ADMIN — AMIODARONE HYDROCHLORIDE 0.5 MG/MIN: 1.8 INJECTION, SOLUTION INTRAVENOUS at 10:11

## 2025-02-26 RX ADMIN — SODIUM CHLORIDE, PRESERVATIVE FREE 10 ML: 5 INJECTION INTRAVENOUS at 09:51

## 2025-02-26 RX ADMIN — APIXABAN 5 MG: 5 TABLET, FILM COATED ORAL at 09:50

## 2025-02-26 RX ADMIN — METOPROLOL SUCCINATE 100 MG: 50 TABLET, EXTENDED RELEASE ORAL at 09:50

## 2025-02-26 RX ADMIN — AMIODARONE HYDROCHLORIDE 200 MG: 200 TABLET ORAL at 14:14

## 2025-02-26 NOTE — PROGRESS NOTES
Discharge Note:      All discharge instructions given at this time as well as all patient belongings returned to patient. Pt denies any further questions regarding discharge at this time. Pt given discharge packet with unit letter, discharge instructions/restrictions and medication handouts regarding all discharge medications and side effects. Pt denies any further issues at this time.    Pt walked to pharmacy, accompanied by RN and given directions to ER exit/entrance.    Pt left premises without any issues in private vehicle at this time.

## 2025-02-26 NOTE — PLAN OF CARE
Problem: Discharge Planning  Goal: Discharge to home or other facility with appropriate resources  2/25/2025 2146 by Joseline Posadas, RN  Outcome: Progressing  Flowsheets (Taken 2/25/2025 2000)  Discharge to home or other facility with appropriate resources: Identify barriers to discharge with patient and caregiver     Problem: ABCDS Injury Assessment  Goal: Absence of physical injury  2/25/2025 2146 by Joseline Posadas, RN  Outcome: Progressing     Problem: Safety - Adult  Goal: Free from fall injury  Outcome: Progressing

## 2025-02-26 NOTE — CONSULTS
[facility]    Consult                        Today's Date: 2/26/2025  Patient Name: John Johansen  Date of admission: 2/25/2025 11:16 AM  Patient's age: 64 y.o., 1960  Admission Dx: Lightheadedness [R42]  Atrial fibrillation with RVR (HCC) [I48.91]    Reason for Consult:  Cardiac evaluation    Requesting Physician: Vani Kirkpatrick MD    CHIEF COMPLAINT:  lightheadedness, afib with RVR    History Obtained From:  patient, electronic medical record    HISTORY OF PRESENT ILLNESS:      The patient is a 64 y.o.  male who is admitted to the hospital for afib with RVR,. He reports he has a history of afib on eliquis. He has been feeling lightheaded intermittently over the last 3 weeks. It became progressively worse since Thursday     He was started on amiodarone gtt in the ER    Past Medical History:   has a past medical history of Acute CHF (congestive heart failure) ([facility]) and Arthritis.    Past Surgical History:   has a past surgical history that includes Splenectomy, total (2000) and Kidney removal (Left).     Home Medications:    Prior to Admission medications    Medication Sig Start Date End Date Taking? Authorizing Provider   spironolactone (ALDACTONE) 50 MG tablet Take 1 tablet by mouth daily   Yes Provider, MD Irene   naproxen (NAPROSYN) 500 MG tablet Take 1 tablet by mouth 2 times daily as needed for Pain   Yes Provider, MD Irene   furosemide (LASIX) 40 MG tablet take 1 tablet by mouth every other day  Patient taking differently: Take 1 tablet by mouth as needed (when legs swelling) 12/29/23  Yes Betty Elam APRN - CNP   metoprolol succinate (TOPROL XL) 100 MG extended release tablet take 1 tablet by mouth once daily 12/18/23  Yes Vipul Vigil DO   sacubitril-valsartan (ENTRESTO) 24-26 MG per tablet Take 1 tablet by mouth 2 times daily 6/2/23  Yes Betty Elam APRN - CNP   apixaban (ELIQUIS) 5 MG TABS tablet Take 1 tablet by mouth 2 times daily 6/2/23

## 2025-02-26 NOTE — DISCHARGE SUMMARY
[facility]  Hospitalist Discharge Summary        Patient: John Johansen  YOB: 1960  MRN: [id number]   Acct: [id number]    Primary Care Physician: Arvind Kingston MD    Admit date  2/25/2025    Discharge date:  2/27/2025 5:24 PM    Chief Complaint on presentation :-  lightheadeness    Discharge Assessment and Plan:-   Afib with rvr  Lightheadedness    Initial H and P and Hospital course:-  65 yo M with hx of CHF, arthritis, who came in with dizziness and nausea. Associated sxms included fatigue. He came to the ER for evaluation. He is on eliquis as he has a history of atrial fibrillation. Patient was found to be in RvR in the ER. He was started on a cardizem drip and admitted to the hospital for cardiology evaluation.   Phos 2.4, Co2 18 with all other labs that are normal.   Patient was seen by cardiology and he was weaned off his drip and started on his home medicines. Patient did not want to stay any longer as he has a family member passing away actively on hospice and he wanted to go home. He understands the risks and will return to the hospital if not improving.     Physical Exam:-  Vitals:   No data found.  Weight:   Weight - Scale: 107.5 kg (237 lb)   24 hour intake/output:   No intake or output data in the 24 hours ending 02/27/25 1724    General appearance: No apparent distress, appears stated age and cooperative.  HEENT: Normal cephalic, atraumatic without obvious deformity. Pupils equal, round, and reactive to light.  Extra ocular muscles intact. Conjunctivae/corneas clear.  Neck: Supple, with full range of motion. No jugular venous distention. Trachea midline.  Respiratory:  Normal respiratory effort. Clear to auscultation, bilaterally without Rales/Wheezes/Rhonchi.  Cardiovascular: Regular rate and rhythm with normal S1/S2 without murmurs, rubs or gallops.  Abdomen: Soft, non-tender, non-distended with normal bowel sounds.  Musculoskeletal:  No clubbing, cyanosis or edema

## 2025-02-26 NOTE — CARE COORDINATION
Case Management Assessment  Initial Evaluation    Date/Time of Evaluation: 2/26/2025 2:21 PM  Assessment Completed by: AB GIBBS RN    If patient is discharged prior to next notation, then this note serves as note for discharge by case management.    Patient Name: John Johansen                   YOB: 1960  Diagnosis: Lightheadedness [R42]  Atrial fibrillation with RVR (HCC) [I48.91]                   Date / Time: 2/25/2025 11:16 AM    Patient Admission Status: Inpatient   Readmission Risk (Low < 19, Mod (19-27), High > 27): Readmission Risk Score: 6.8    Current PCP: Arvind Kingston MD  PCP verified by CM? Yes    Chart Reviewed: Yes      History Provided by: Patient  Patient Orientation: Alert and Oriented, Person, Place, Situation, Self    Patient Cognition: Alert    Hospitalization in the last 30 days (Readmission):  No    If yes, Readmission Assessment in  Navigator will be completed.    Advance Directives:      Code Status: Full Code   Patient's Primary Decision Maker is: Legal Next of Kin      Discharge Planning:    Patient lives with: Alone Type of Home: House  Primary Care Giver: Self  Patient Support Systems include: Family Members   Current Financial resources: None  Current community resources: None  Current services prior to admission: None            Current DME:              Type of Home Care services:  None    ADLS  Prior functional level: Independent in ADLs/IADLs  Current functional level: Independent in ADLs/IADLs    PT AM-PAC:   /24  OT AM-PAC:   /24    Family can provide assistance at DC: No  Would you like Case Management to discuss the discharge plan with any other family members/significant others, and if so, who? Yes (brother)  Plans to Return to Present Housing: Yes  Other Identified Issues/Barriers to RETURNING to current housing: medical condition  Potential Assistance needed at discharge: N/A            Potential DME:    Patient expects to discharge to: House  Plan

## 2025-02-26 NOTE — PROGRESS NOTES
Occupational Therapy  [facility]  Occupational Therapy Not Seen Note    Patient not available for Occupational Therapy due to:    [] Testing:    [] Hemodialysis    [] Cancelled by RN:    []Refusal by Patient:    [] Surgery:     [] Intubation:     [] Pain Medication:    [] Sedation:     [] Spine Precautions :    [] Medical Instability:    [x] Other: Per RN pt is independent with no skilled OT needs. Will defer OT eval, please reorder if functional status changes.        Helen MATAMOROS, OT

## 2025-02-26 NOTE — PROGRESS NOTES
Spiritual Health History and Assessment/Progress Note  [facility]    (P) Initial Encounter,  ,  ,      Name: John Johansen MRN: [id number]    Age: 64 y.o.     Sex: male   Language: English   Adventism: Non-Sikh   Atrial fibrillation with RVR (HCC)     Date: 2/26/2025            Total Time Calculated: (P) 10 min              Spiritual Assessment began in [facility]        Referral/Consult From: (P) Rounding   Encounter Overview/Reason: (P) Initial Encounter  Service Provided For: (P) Patient    Freda, Belief, Meaning:   Patient identifies as spiritual, is connected with a freda tradition or spiritual practice, and has beliefs or practices that help with coping during difficult times  Family/Friends No family/friends present      Importance and Influence:  Patient has spiritual/personal beliefs that influence decisions regarding their health  Family/Friends No family/friends present    Community:  Patient is connected with a spiritual community and feels well-supported. Support system includes: Children, Freda Community, Friends, and Extended family    Assessment and Plan of Care:     Patient Interventions include: Affirmed coping skills/support systems    Patient Plan of Care: Spiritual Care available upon further referral    Electronically signed by Larry Ibanez Jrlain on 2/26/2025 at 9:59 AM     02/26/25 0957   Encounter Summary   Encounter Overview/Reason Initial Encounter   Service Provided For Patient   Referral/Consult From Rounding   Support System Children;Family members;Sikhism/freda community;Friends/neighbors   Last Encounter  02/26/25   Complexity of Encounter Moderate   Begin Time 0920   End Time  0930   Total Time Calculated 10 min   Assessment/Intervention/Outcome   Assessment Calm;Peaceful   Intervention Discussed belief system/Synagogue practices/freda;Nurtured Hope;Sustaining Presence/Ministry of presence   Outcome Engaged in conversation;Expressed Gratitude   Plan

## 2025-02-26 NOTE — PLAN OF CARE
Problem: Discharge Planning  Goal: Discharge to home or other facility with appropriate resources  Outcome: Progressing  Flowsheets (Taken 2/26/2025 0930)  Discharge to home or other facility with appropriate resources:   Identify barriers to discharge with patient and caregiver   Arrange for needed discharge resources and transportation as appropriate   Identify discharge learning needs (meds, wound care, etc)   Refer to discharge planning if patient needs post-hospital services based on physician order or complex needs related to functional status, cognitive ability or social support system     Problem: ABCDS Injury Assessment  Goal: Absence of physical injury  Outcome: Progressing     Problem: Safety - Adult  Goal: Free from fall injury  Outcome: Progressing  Flowsheets (Taken 2/26/2025 9640)  Free From Fall Injury:   Instruct family/caregiver on patient safety   Based on caregiver fall risk screen, instruct family/caregiver to ask for assistance with transferring infant if caregiver noted to have fall risk factors

## 2025-02-26 NOTE — PROGRESS NOTES
Physical Therapy  DATE: 2025    NAME: John Johansen  MRN: [id number]   : 1960    Patient not seen this date for Physical Therapy due to:      [] Cancel by RN or physician due to:    [] Hemodialysis    [] Critical Lab Value Level     [] Blood transfusion in progress    [] Acute or unstable cardiovascular status   _MAP < 55 or more than >115  _HR < 40 or > 130    [] Acute or unstable pulmonary status   -FiO2 > 60%   _RR < 5 or >40    _O2 sats < 85%    [] Strict Bedrest    [] Off Unit for surgery or procedure    [] Off Unit for testing       [] Pending imaging to R/O fracture    [] Refusal by Patient      [] Other      [x] PT being discontinued at this time. Patient independent. No further needs.  Writer spoke w/ LO Napoles this date who reports pt has been up ambulating throughout admission w/o assistance.  PT will defer evaluation this date.  Please re-order skilled PT if functional mobility status changes.       [] PT being discontinued at this time as the patient has been transferred to hospice care. No further needs.      Summer Sanchez, PT

## 2025-02-26 NOTE — H&P
Hospitalist - History & Physical      Patient: John Johansen    Unit/Bed:2032/2032-01  YOB: 1960  MRN: [id number]   Acct: [id number]   PCP: Arvind Kingston MD    Date of Service: Pt seen/examined on 02/26/25  and Admitted to Observation with expected LOS less than two midnights due to medical therapy.     Chief Complaint:  lightheadedness    Assessment and Plan:-  Atrial Fibrillation with Rvr  Lightheadedness      Plan:   Admitted to telemetry  Started on cardizem in ER and weaned off to home regimen. Cardiology evaluated pt.   Pt discharged home as he wanted to go home to his relative who is on hospice and passing away. He will return to hospital if worsening sxms.   Resume home medicines.       History Of Present Illness:    63 yo M with hx of CHF, arthritis, who came in with dizziness and nausea. Associated sxms included fatigue. He came to the ER for evaluation. He is on eliquis as he has a history of atrial fibrillation. Patient was found to be in RvR in the ER. He was started on a cardizem drip and admitted to the hospital for cardiology evaluation.   Phos 2.4, Co2 18 with all other labs that are normal.   Patient was seen by cardiology and he was weaned off his drip and started on his home medicines. Patient did not want to stay any longer as he has a family member passing away actively on hospice and he wanted to go home. He understands the risks and will return to the hospital if not improving.       Past Medical History:        Diagnosis Date    Acute CHF (congestive heart failure) ([facility]) 05/10/2023    Arthritis        Past Surgical History:        Procedure Laterality Date    KIDNEY REMOVAL Left     Pt donated L kidney to brother    SPLENECTOMY, TOTAL  2000    Spleen was punctured while removing kidney       Home Medications:   No current facility-administered medications on file prior to encounter.     Current Outpatient Medications on File Prior to Encounter   Medication Sig Dispense

## 2025-02-26 NOTE — PLAN OF CARE
Problem: Discharge Planning  Goal: Discharge to home or other facility with appropriate resources  2/26/2025 1439 by Yesika Correa RN  Outcome: Adequate for Discharge  2/26/2025 1438 by Yesika Correa RN  Outcome: Progressing  Flowsheets (Taken 2/26/2025 0930)  Discharge to home or other facility with appropriate resources:   Identify barriers to discharge with patient and caregiver   Arrange for needed discharge resources and transportation as appropriate   Identify discharge learning needs (meds, wound care, etc)   Refer to discharge planning if patient needs post-hospital services based on physician order or complex needs related to functional status, cognitive ability or social support system     Problem: ABCDS Injury Assessment  Goal: Absence of physical injury  2/26/2025 1439 by Yesika Correa RN  Outcome: Adequate for Discharge  2/26/2025 1438 by Yesika Correa RN  Outcome: Progressing     Problem: Safety - Adult  Goal: Free from fall injury  2/26/2025 1439 by Yesika Correa RN  Outcome: Adequate for Discharge  2/26/2025 1438 by Yesika Correa RN  Outcome: Progressing  Flowsheets (Taken 2/26/2025 0740)  Free From Fall Injury:   Instruct family/caregiver on patient safety   Based on caregiver fall risk screen, instruct family/caregiver to ask for assistance with transferring infant if caregiver noted to have fall risk factors

## 2025-02-26 NOTE — PROGRESS NOTES
CLINICAL PHARMACY NOTE: MEDS TO BEDS    Total # of Prescriptions Filled: 1   The following medications were delivered to the patient:  Amiodarone 200mg    Additional Documentation:

## 2025-07-09 RX ORDER — SODIUM CHLORIDE 9 MG/ML
INJECTION, SOLUTION INTRAVENOUS PRN
OUTPATIENT
Start: 2025-07-10 | End: 2025-07-11

## 2025-07-10 ENCOUNTER — HOSPITAL ENCOUNTER (OUTPATIENT)
Age: 65
Setting detail: OUTPATIENT SURGERY
Discharge: HOME | End: 2025-07-10
Attending: INTERNAL MEDICINE

## 2025-08-07 ENCOUNTER — ANESTHESIA EVENT (OUTPATIENT)
Age: 65
End: 2025-08-07
Payer: COMMERCIAL

## 2025-08-07 RX ORDER — SODIUM CHLORIDE 9 MG/ML
INJECTION, SOLUTION INTRAVENOUS CONTINUOUS
Status: CANCELLED | OUTPATIENT
Start: 2025-08-08

## 2025-08-07 RX ORDER — SODIUM CHLORIDE 0.9 % (FLUSH) 0.9 %
5-40 SYRINGE (ML) INJECTION EVERY 12 HOURS SCHEDULED
Status: CANCELLED | OUTPATIENT
Start: 2025-08-08

## 2025-08-07 RX ORDER — SODIUM CHLORIDE 0.9 % (FLUSH) 0.9 %
5-40 SYRINGE (ML) INJECTION PRN
Status: CANCELLED | OUTPATIENT
Start: 2025-08-08

## 2025-08-07 RX ORDER — LIDOCAINE HYDROCHLORIDE 10 MG/ML
1 INJECTION, SOLUTION EPIDURAL; INFILTRATION; INTRACAUDAL; PERINEURAL
Status: CANCELLED | OUTPATIENT
Start: 2025-08-08

## 2025-08-07 RX ORDER — SODIUM CHLORIDE 9 MG/ML
INJECTION, SOLUTION INTRAVENOUS PRN
Status: CANCELLED | OUTPATIENT
Start: 2025-08-08

## 2025-08-07 RX ORDER — SODIUM CHLORIDE, SODIUM LACTATE, POTASSIUM CHLORIDE, CALCIUM CHLORIDE 600; 310; 30; 20 MG/100ML; MG/100ML; MG/100ML; MG/100ML
INJECTION, SOLUTION INTRAVENOUS CONTINUOUS
Status: CANCELLED | OUTPATIENT
Start: 2025-08-08

## 2025-08-07 RX ORDER — SODIUM CHLORIDE 9 MG/ML
INJECTION, SOLUTION INTRAVENOUS PRN
Status: DISCONTINUED | OUTPATIENT
Start: 2025-08-07 | End: 2025-08-07

## 2025-08-08 ENCOUNTER — HOSPITAL ENCOUNTER (OUTPATIENT)
Age: 65
Setting detail: OUTPATIENT SURGERY
Discharge: HOME | End: 2025-08-10
Attending: INTERNAL MEDICINE
Payer: COMMERCIAL

## 2025-08-08 ENCOUNTER — ANESTHESIA (OUTPATIENT)
Age: 65
End: 2025-08-08
Payer: COMMERCIAL

## 2025-08-08 VITALS
WEIGHT: 247 LBS | RESPIRATION RATE: 15 BRPM | OXYGEN SATURATION: 99 % | SYSTOLIC BLOOD PRESSURE: 128 MMHG | TEMPERATURE: 97.7 F | DIASTOLIC BLOOD PRESSURE: 78 MMHG | BODY MASS INDEX: 30.08 KG/M2 | HEART RATE: 63 BPM | HEIGHT: 76 IN

## 2025-08-08 DIAGNOSIS — I48.0 PAROXYSMAL ATRIAL FIBRILLATION (HCC): ICD-10-CM

## 2025-08-08 LAB
ANION GAP SERPL CALCULATED.3IONS-SCNC: 12 MMOL/L (ref 9–16)
BUN SERPL-MCNC: 19 MG/DL (ref 8–23)
CALCIUM SERPL-MCNC: 8.9 MG/DL (ref 8.8–10.2)
CHLORIDE SERPL-SCNC: 105 MMOL/L (ref 98–107)
CO2 SERPL-SCNC: 21 MMOL/L (ref 20–31)
CREAT SERPL-MCNC: 1.1 MG/DL (ref 0.7–1.2)
EKG ATRIAL RATE: 326 BPM
EKG Q-T INTERVAL: 406 MS
EKG QRS DURATION: 96 MS
EKG QTC CALCULATION (BAZETT): 468 MS
EKG R AXIS: -18 DEGREES
EKG T AXIS: 6 DEGREES
EKG VENTRICULAR RATE: 80 BPM
ERYTHROCYTE [DISTWIDTH] IN BLOOD BY AUTOMATED COUNT: 14.9 % (ref 11.8–14.4)
GFR, ESTIMATED: 73 ML/MIN/1.73M2
GLUCOSE SERPL-MCNC: 82 MG/DL (ref 82–115)
HCT VFR BLD AUTO: 36.1 % (ref 40.7–50.3)
HGB BLD-MCNC: 13.3 G/DL (ref 13–17)
MCH RBC QN AUTO: 35.9 PG (ref 25.2–33.5)
MCHC RBC AUTO-ENTMCNC: 36.8 G/DL (ref 28.4–34.8)
MCV RBC AUTO: 97.6 FL (ref 82.6–102.9)
NRBC BLD-RTO: 0 PER 100 WBC
PLATELET # BLD AUTO: 416 K/UL (ref 138–453)
PMV BLD AUTO: 11.6 FL (ref 8.1–13.5)
POTASSIUM SERPL-SCNC: 5 MMOL/L (ref 3.7–5.3)
RBC # BLD AUTO: 3.7 M/UL (ref 4.21–5.77)
SODIUM SERPL-SCNC: 138 MMOL/L (ref 136–145)
WBC OTHER # BLD: 8.1 K/UL (ref 3.5–11.3)

## 2025-08-08 PROCEDURE — 36415 COLL VENOUS BLD VENIPUNCTURE: CPT

## 2025-08-08 PROCEDURE — 3700000000 HC ANESTHESIA ATTENDED CARE: Performed by: INTERNAL MEDICINE

## 2025-08-08 PROCEDURE — 7100000001 HC PACU RECOVERY - ADDTL 15 MIN: Performed by: INTERNAL MEDICINE

## 2025-08-08 PROCEDURE — 2580000003 HC RX 258: Performed by: INTERNAL MEDICINE

## 2025-08-08 PROCEDURE — 93005 ELECTROCARDIOGRAM TRACING: CPT | Performed by: INTERNAL MEDICINE

## 2025-08-08 PROCEDURE — 92960 CARDIOVERSION ELECTRIC EXT: CPT | Performed by: INTERNAL MEDICINE

## 2025-08-08 PROCEDURE — 80048 BASIC METABOLIC PNL TOTAL CA: CPT

## 2025-08-08 PROCEDURE — 99221 1ST HOSP IP/OBS SF/LOW 40: CPT | Performed by: INTERNAL MEDICINE

## 2025-08-08 PROCEDURE — 85027 COMPLETE CBC AUTOMATED: CPT

## 2025-08-08 PROCEDURE — 7100000011 HC PHASE II RECOVERY - ADDTL 15 MIN: Performed by: INTERNAL MEDICINE

## 2025-08-08 PROCEDURE — 6370000000 HC RX 637 (ALT 250 FOR IP): Performed by: INTERNAL MEDICINE

## 2025-08-08 PROCEDURE — 7100000010 HC PHASE II RECOVERY - FIRST 15 MIN: Performed by: INTERNAL MEDICINE

## 2025-08-08 PROCEDURE — 6360000002 HC RX W HCPCS: Performed by: NURSE ANESTHETIST, CERTIFIED REGISTERED

## 2025-08-08 PROCEDURE — 7100000000 HC PACU RECOVERY - FIRST 15 MIN: Performed by: INTERNAL MEDICINE

## 2025-08-08 RX ORDER — SODIUM CHLORIDE 9 MG/ML
INJECTION, SOLUTION INTRAVENOUS PRN
Status: ACTIVE | OUTPATIENT
Start: 2025-08-08 | End: 2025-08-09

## 2025-08-08 RX ORDER — FUROSEMIDE 40 MG/1
40 TABLET ORAL PRN
Qty: 90 TABLET | Refills: 3 | Status: SHIPPED | OUTPATIENT
Start: 2025-08-08

## 2025-08-08 RX ORDER — PROPOFOL 10 MG/ML
INJECTION, EMULSION INTRAVENOUS
Status: DISCONTINUED | OUTPATIENT
Start: 2025-08-08 | End: 2025-08-08 | Stop reason: SDUPTHER

## 2025-08-08 RX ORDER — SODIUM CHLORIDE 9 MG/ML
INJECTION, SOLUTION INTRAVENOUS PRN
Status: CANCELLED | OUTPATIENT
Start: 2025-08-08

## 2025-08-08 RX ORDER — ONDANSETRON 2 MG/ML
4 INJECTION INTRAMUSCULAR; INTRAVENOUS
Status: CANCELLED | OUTPATIENT
Start: 2025-08-08

## 2025-08-08 RX ORDER — SODIUM CHLORIDE 0.9 % (FLUSH) 0.9 %
5-40 SYRINGE (ML) INJECTION PRN
Status: CANCELLED | OUTPATIENT
Start: 2025-08-08

## 2025-08-08 RX ORDER — METOCLOPRAMIDE HYDROCHLORIDE 5 MG/ML
10 INJECTION INTRAMUSCULAR; INTRAVENOUS
Status: CANCELLED | OUTPATIENT
Start: 2025-08-08

## 2025-08-08 RX ORDER — ASPIRIN 81 MG/1
81 TABLET, CHEWABLE ORAL ONCE
Status: COMPLETED | OUTPATIENT
Start: 2025-08-08 | End: 2025-08-08

## 2025-08-08 RX ORDER — SODIUM CHLORIDE 0.9 % (FLUSH) 0.9 %
5-40 SYRINGE (ML) INJECTION EVERY 12 HOURS SCHEDULED
Status: CANCELLED | OUTPATIENT
Start: 2025-08-08

## 2025-08-08 RX ADMIN — PROPOFOL 60 MG: 10 INJECTION, EMULSION INTRAVENOUS at 13:46

## 2025-08-08 RX ADMIN — SODIUM CHLORIDE: 0.9 INJECTION, SOLUTION INTRAVENOUS at 12:46

## 2025-08-08 RX ADMIN — PROPOFOL 40 MG: 10 INJECTION, EMULSION INTRAVENOUS at 13:47

## 2025-08-08 RX ADMIN — ASPIRIN 81 MG: 81 TABLET, CHEWABLE ORAL at 12:49

## 2025-08-08 ASSESSMENT — PAIN SCALES - GENERAL: PAINLEVEL_OUTOF10: 0

## 2025-08-09 LAB
EKG ATRIAL RATE: 61 BPM
EKG P AXIS: 50 DEGREES
EKG P-R INTERVAL: 202 MS
EKG Q-T INTERVAL: 448 MS
EKG QRS DURATION: 90 MS
EKG QTC CALCULATION (BAZETT): 450 MS
EKG R AXIS: -2 DEGREES
EKG T AXIS: 15 DEGREES
EKG VENTRICULAR RATE: 61 BPM